# Patient Record
Sex: FEMALE | Race: WHITE | NOT HISPANIC OR LATINO | URBAN - METROPOLITAN AREA
[De-identification: names, ages, dates, MRNs, and addresses within clinical notes are randomized per-mention and may not be internally consistent; named-entity substitution may affect disease eponyms.]

---

## 2019-09-02 ENCOUNTER — EMERGENCY (EMERGENCY)
Facility: HOSPITAL | Age: 49
LOS: 1 days | Discharge: HOME | End: 2019-09-02
Attending: EMERGENCY MEDICINE | Admitting: EMERGENCY MEDICINE
Payer: COMMERCIAL

## 2019-09-02 VITALS
HEART RATE: 84 BPM | OXYGEN SATURATION: 99 % | DIASTOLIC BLOOD PRESSURE: 82 MMHG | TEMPERATURE: 99 F | SYSTOLIC BLOOD PRESSURE: 126 MMHG | RESPIRATION RATE: 19 BRPM

## 2019-09-02 VITALS
RESPIRATION RATE: 18 BRPM | DIASTOLIC BLOOD PRESSURE: 85 MMHG | HEART RATE: 94 BPM | SYSTOLIC BLOOD PRESSURE: 122 MMHG | WEIGHT: 190.92 LBS | OXYGEN SATURATION: 98 % | TEMPERATURE: 99 F

## 2019-09-02 LAB
ALBUMIN SERPL ELPH-MCNC: 4.1 G/DL — SIGNIFICANT CHANGE UP (ref 3.3–5)
ALP SERPL-CCNC: 80 U/L — SIGNIFICANT CHANGE UP (ref 40–120)
ALT FLD-CCNC: 20 U/L — SIGNIFICANT CHANGE UP (ref 10–45)
ANION GAP SERPL CALC-SCNC: 14 MMOL/L — SIGNIFICANT CHANGE UP (ref 5–17)
APTT BLD: 32.4 SEC — SIGNIFICANT CHANGE UP (ref 27.5–36.3)
AST SERPL-CCNC: 24 U/L — SIGNIFICANT CHANGE UP (ref 10–40)
BASOPHILS # BLD AUTO: 0.04 K/UL — SIGNIFICANT CHANGE UP (ref 0–0.2)
BASOPHILS NFR BLD AUTO: 0.3 % — SIGNIFICANT CHANGE UP (ref 0–2)
BILIRUB SERPL-MCNC: 0.3 MG/DL — SIGNIFICANT CHANGE UP (ref 0.2–1.2)
BUN SERPL-MCNC: 16 MG/DL — SIGNIFICANT CHANGE UP (ref 7–23)
CALCIUM SERPL-MCNC: 9.7 MG/DL — SIGNIFICANT CHANGE UP (ref 8.4–10.5)
CHLORIDE SERPL-SCNC: 100 MMOL/L — SIGNIFICANT CHANGE UP (ref 96–108)
CO2 SERPL-SCNC: 22 MMOL/L — SIGNIFICANT CHANGE UP (ref 22–31)
CREAT SERPL-MCNC: 0.82 MG/DL — SIGNIFICANT CHANGE UP (ref 0.5–1.3)
EOSINOPHIL # BLD AUTO: 0.07 K/UL — SIGNIFICANT CHANGE UP (ref 0–0.5)
EOSINOPHIL NFR BLD AUTO: 0.5 % — SIGNIFICANT CHANGE UP (ref 0–6)
ERYTHROCYTE [SEDIMENTATION RATE] IN BLOOD: 47 MM/HR — HIGH
GLUCOSE SERPL-MCNC: 91 MG/DL — SIGNIFICANT CHANGE UP (ref 70–99)
HCT VFR BLD CALC: 41.4 % — SIGNIFICANT CHANGE UP (ref 34.5–45)
HGB BLD-MCNC: 13.4 G/DL — SIGNIFICANT CHANGE UP (ref 11.5–15.5)
IMM GRANULOCYTES NFR BLD AUTO: 0.4 % — SIGNIFICANT CHANGE UP (ref 0–1.5)
INR BLD: 0.99 — SIGNIFICANT CHANGE UP (ref 0.88–1.16)
LYMPHOCYTES # BLD AUTO: 2.8 K/UL — SIGNIFICANT CHANGE UP (ref 1–3.3)
LYMPHOCYTES # BLD AUTO: 21.3 % — SIGNIFICANT CHANGE UP (ref 13–44)
MCHC RBC-ENTMCNC: 25.8 PG — LOW (ref 27–34)
MCHC RBC-ENTMCNC: 32.4 GM/DL — SIGNIFICANT CHANGE UP (ref 32–36)
MCV RBC AUTO: 79.6 FL — LOW (ref 80–100)
MONOCYTES # BLD AUTO: 1.09 K/UL — HIGH (ref 0–0.9)
MONOCYTES NFR BLD AUTO: 8.3 % — SIGNIFICANT CHANGE UP (ref 2–14)
NEUTROPHILS # BLD AUTO: 9.1 K/UL — HIGH (ref 1.8–7.4)
NEUTROPHILS NFR BLD AUTO: 69.2 % — SIGNIFICANT CHANGE UP (ref 43–77)
NRBC # BLD: 0 /100 WBCS — SIGNIFICANT CHANGE UP (ref 0–0)
PLATELET # BLD AUTO: 423 K/UL — HIGH (ref 150–400)
POTASSIUM SERPL-MCNC: 4.3 MMOL/L — SIGNIFICANT CHANGE UP (ref 3.5–5.3)
POTASSIUM SERPL-SCNC: 4.3 MMOL/L — SIGNIFICANT CHANGE UP (ref 3.5–5.3)
PROT SERPL-MCNC: 8.2 G/DL — SIGNIFICANT CHANGE UP (ref 6–8.3)
PROTHROM AB SERPL-ACNC: 11.2 SEC — SIGNIFICANT CHANGE UP (ref 10–12.9)
RBC # BLD: 5.2 M/UL — SIGNIFICANT CHANGE UP (ref 3.8–5.2)
RBC # FLD: 13.4 % — SIGNIFICANT CHANGE UP (ref 10.3–14.5)
SODIUM SERPL-SCNC: 136 MMOL/L — SIGNIFICANT CHANGE UP (ref 135–145)
WBC # BLD: 13.15 K/UL — HIGH (ref 3.8–10.5)
WBC # FLD AUTO: 13.15 K/UL — HIGH (ref 3.8–10.5)

## 2019-09-02 PROCEDURE — 80053 COMPREHEN METABOLIC PANEL: CPT

## 2019-09-02 PROCEDURE — 85610 PROTHROMBIN TIME: CPT

## 2019-09-02 PROCEDURE — 99283 EMERGENCY DEPT VISIT LOW MDM: CPT

## 2019-09-02 PROCEDURE — 85730 THROMBOPLASTIN TIME PARTIAL: CPT

## 2019-09-02 PROCEDURE — 99284 EMERGENCY DEPT VISIT MOD MDM: CPT

## 2019-09-02 PROCEDURE — 85652 RBC SED RATE AUTOMATED: CPT

## 2019-09-02 PROCEDURE — 36415 COLL VENOUS BLD VENIPUNCTURE: CPT

## 2019-09-02 PROCEDURE — 85025 COMPLETE CBC W/AUTO DIFF WBC: CPT

## 2019-09-02 RX ORDER — IBUPROFEN 200 MG
400 TABLET ORAL ONCE
Refills: 0 | Status: COMPLETED | OUTPATIENT
Start: 2019-09-02 | End: 2019-09-02

## 2019-09-02 RX ADMIN — Medication 400 MILLIGRAM(S): at 21:44

## 2019-09-02 RX ADMIN — Medication 400 MILLIGRAM(S): at 21:39

## 2019-09-02 NOTE — ED PROVIDER NOTE - PHYSICAL EXAMINATION
CONSTITUTIONAL: WD,WN. NAD.    SKIN: Normal color and turgor. Numerous palpable, nontender, purple-red non-blanching skin lesions to bilateral lower legs and buttocks, ranging in size from 0.5-1 cm in diameter.  No mucosal lesions.  No lesions to palms/soles.    HEAD: NC/AT.  EYES: Conjunctiva clear. EOMI. PERRL.    ENT: Airway patent, OP without erythema, tonsillar swelling or exudate; uvula midline without swelling. Nasal mucosa clear, no rhinorrhea.   RESPIRATORY:  Breathing non-labored. No retractions or accessory muscle use.  Lungs CTA bilat.  CARDIOVASCULAR:  RRR, S1S2. No M/R/G.      GI:  Abdomen soft, nontender.    MSK: Neck supple with painless ROM.  No lower extremity edema or calf tenderness.  Mild puffiness to volar aspect left wrist, no erythema/warmth.  Joints with FROM.   NEURO: Alert and oriented; CN II-XII grossly intact. Speech clear. 5/5 strength in all extremities.  Normal balance and gait.

## 2019-09-02 NOTE — ED PROVIDER NOTE - NS ED ROS FT
CONSTITUTIONAL: No fever, chills, or weakness  NEURO: No headache, no dizziness, no syncope; No focal weakness/tingling/numbness  EYES: No visual changes  ENT: No rhinorrhea or sore throat  PULM: No cough or dyspnea  CV: No chest pain or palpitations  GI: No abdominal pain, vomiting, or diarrhea  : No dysuria, hematuria, frequency  MSK: No neck pain or back pain  SKIN: HPI

## 2019-09-02 NOTE — ED PROVIDER NOTE - ATTENDING CONTRIBUTION TO CARE
48F pmh HSP 20yr ago p/w rash to legs, stiffness to wrists & l elbow. Similar to prior HSP onset sx. No f/c, no abd pain, no n/v, no gi bleed sx. Nontoxic appearing. Has BLE palpable purpura b/l to thigh & buttocks. Labs noted for leukocytosis, no MOY. stable. Has rheum for outpt f/u w/ likely HSP

## 2019-09-02 NOTE — ED PROVIDER NOTE - OBJECTIVE STATEMENT
47 yo fem with Hx of Henoch-Schonlein Purpura apx 10 yrs ago was in [facility] until waking this morning with small red bumps on her ankles.  She initially thought she had insect bites, but then noticed that the rash involved both legs including thighs and buttocks, which had been covered by long pants.  She developed stiffness and mild puffiness of her wrists today, and soreness in the left elbow.  No fever, abdominal pain, NVD, blood in stool/melena, hematuria, or other stigmata of bleeding.  She is menstruating, bleeding consistent with her usual period.  No cough/URI symptoms, chest pain, or dyspnea.  Symptoms similar to her presentation of HSP in the past.    HSP treated in the past with a course of steroids, fully recovered.  Was managed by rheumatologist (Dr Shahid).  No longer has a PCP (was Dr Millan).

## 2019-09-02 NOTE — ED PROVIDER NOTE - CARE PROVIDER_API CALL
Bridgette Shahid)  Rheumatology  [address] [place] [address], [place] [address]  Phone: [phone]  Fax: [phone]  Follow Up Time: 1-3 Days

## 2019-09-02 NOTE — ED PROVIDER NOTE - CLINICAL SUMMARY MEDICAL DECISION MAKING FREE TEXT BOX
Acute onset bilateral lower extremity palpable purpura with joint stiffness.  Patient otherwise well-appearing and nontoxic.  Suspicious for recurrence of HSP.  Will check labs, give NSAIDs for mild joint pain.  If labs without significant abnormalities, she will be able to follow up with Rheum as outpatient.

## 2019-09-02 NOTE — ED ADULT NURSE NOTE - OBJECTIVE STATEMENT
Patient is a 49yo female reporting palpable purpura to bilateral legs and buttocks as well as pain and swelling to bilateral wrists and left elbow. Patient has hx of Henoch-Schönlein purpura and reports symptoms are similar. Denies chest pain, shortness of breath, abdominal pain, n/v/d, fevers.

## 2019-09-02 NOTE — ED PROVIDER NOTE - NSFOLLOWUPINSTRUCTIONS_ED_ALL_ED_FT
Please follow up with your rheumatologist in the next 1-3 days.  Treat joint pain/stiffness with ibuprofen or naproxen; use as directed.  Return to the Emergency Department if you have any new or worsening symptoms, or if you have any concerns.  _____________________________________________________    Henoch–Schonlein Purpura, Adult  Henoch–Schonlein purpura (HSP) is a condition that causes swelling and inflammation of small blood vessels (vasculitis). This inflammation makes blood vessels leak, which causes a rash. The rash can appear anywhere, but it is most common on the arms, legs, and buttocks. HSP can also cause:  Bleeding into your bowel and kidneys.  Joint inflammation.  Kidney damage. It can lead to long-term or end-stage kidney disease and kidney failure.  What are the causes?  An overactive disease-fighting system (immune system) causes HSP. This means that something triggers your immune system to produce proteins (antibodies) that attack your blood vessels. This is called an autoimmune reaction. Triggers for HSP may include:  Infections from certain viruses or bacteria.  Certain medicines.  Certain types of cancer.  What increases the risk?  You are more likely to develop this condition if:  You have allergies.  You have a family history of HSP.  You are male.  What are the signs or symptoms?  The main symptom of this condition is a skin rash that may look like raised bruises or dots. The rash changes color over time from reddish-purple to rust-colored. The rash color does not go away when you press the spots.    Other symptoms include:  Fever.  Tiredness (fatigue).  Abdomen (abdominal) pain. This may include nausea, vomiting, and diarrhea.  Blood or protein in your urine.  Blood in your stool.  Swollen and painful joints, especially the knees and ankles.  Swollen testicles in men.  How is this diagnosed?  This condition may be diagnosed based on:  Your symptoms and medical history.  A physical exam.  Urine tests to check for blood or protein.  Blood tests to check for complications and to make sure there is not another cause of your illness.  Removing a small skin sample and examining it under a microscope (skin biopsy).  Kidney biopsy.  How is this treated?  There is no cure for this condition, but treatment can help to relieve symptoms. Treatment may include:  Staying in bed (bed rest).  Raising (elevating) affected arms and legs above the level of the heart.  Drinking more fluids.  Taking medicines to reduce inflammation and pain. These may include:  NSAIDs to relieve aches and fever.  Corticosteroids to lessen inflammation and relieve pain.  If you have severe kidney damage, you may need medicines that reduce the activity of the immune system (immunosuppressants). Kidney failure may be treated with a process to clean the blood of wastes, salt, and extra fluid (dialysis).    Follow these instructions at home:    Take over-the-counter and prescription medicines only as told by your health care provider.  Follow instructions from your health care provider about activity restrictions or bed rest.  Raise (elevate) affected arms and legs above the level of your heart when you experience symptoms.  Drink enough fluid to keep your urine pale yellow.  Keep all follow-up visits as told by your health care provider. This is important.  Contact a health care provider if:  Your symptoms get worse, and medicines do not help.  You have a little bit of blood in your urine or stool.  Get help right away if:  You have severe abdominal pain.  You vomit repeatedly.  You have heavy bleeding in your urine or stool.  You are not able to urinate.  You have abdominal pain and are not having any bowel movements.  You have a seizure.  You have mental confusion.  You have chest pain.  You have trouble breathing or shortness of breath.  Summary  Henoch–Schonlein purpura (HSP) is a condition that causes swelling and inflammation of small blood vessels (vasculitis).  An overactive disease-fighting system (immune system) causes HSP.  A skin rash is the most common symptom.  There is no cure for HSP, but treatment may help to relieve symptoms.  This information is not intended to replace advice given to you by your health care provider. Make sure you discuss any questions you have with your health care provider.

## 2019-09-02 NOTE — ED ADULT TRIAGE NOTE - CHIEF COMPLAINT QUOTE
pt noticed a rash on her legs from feet to groin this am and is also having some stiffness in her hands and elbows, hx HSP in 2008 and states the symptoms were similar at that time

## 2019-09-02 NOTE — ED PROVIDER NOTE - PATIENT PORTAL LINK FT
You can access the FollowMyHealth Patient Portal offered by [facility] by registering at the following website: [email or web address]. By joining [employer]’s FollowMyHealth portal, you will also be able to view your health information using other applications (apps) compatible with our system.

## 2019-09-07 DIAGNOSIS — R21 RASH AND OTHER NONSPECIFIC SKIN ERUPTION: ICD-10-CM

## 2019-09-07 DIAGNOSIS — D69.0 ALLERGIC PURPURA: ICD-10-CM

## 2019-09-09 ENCOUNTER — EMERGENCY (EMERGENCY)
Facility: HOSPITAL | Age: 49
LOS: 1 days | Discharge: HOME | End: 2019-09-09
Attending: EMERGENCY MEDICINE | Admitting: EMERGENCY MEDICINE
Payer: COMMERCIAL

## 2019-09-09 VITALS
HEART RATE: 78 BPM | RESPIRATION RATE: 18 BRPM | DIASTOLIC BLOOD PRESSURE: 64 MMHG | SYSTOLIC BLOOD PRESSURE: 100 MMHG | OXYGEN SATURATION: 98 % | TEMPERATURE: 98 F

## 2019-09-09 VITALS
SYSTOLIC BLOOD PRESSURE: 122 MMHG | OXYGEN SATURATION: 99 % | HEART RATE: 86 BPM | TEMPERATURE: 98 F | RESPIRATION RATE: 22 BRPM | DIASTOLIC BLOOD PRESSURE: 84 MMHG | HEIGHT: 64 IN | WEIGHT: 184.97 LBS

## 2019-09-09 DIAGNOSIS — R10.9 UNSPECIFIED ABDOMINAL PAIN: ICD-10-CM

## 2019-09-09 DIAGNOSIS — R10.13 EPIGASTRIC PAIN: ICD-10-CM

## 2019-09-09 DIAGNOSIS — E86.0 DEHYDRATION: ICD-10-CM

## 2019-09-09 DIAGNOSIS — R42 DIZZINESS AND GIDDINESS: ICD-10-CM

## 2019-09-09 DIAGNOSIS — R11.2 NAUSEA WITH VOMITING, UNSPECIFIED: ICD-10-CM

## 2019-09-09 LAB
ALBUMIN SERPL ELPH-MCNC: 3.7 G/DL — SIGNIFICANT CHANGE UP (ref 3.3–5)
ALP SERPL-CCNC: 66 U/L — SIGNIFICANT CHANGE UP (ref 40–120)
ALT FLD-CCNC: 15 U/L — SIGNIFICANT CHANGE UP (ref 10–45)
ANION GAP SERPL CALC-SCNC: 12 MMOL/L — SIGNIFICANT CHANGE UP (ref 5–17)
APPEARANCE UR: CLEAR — SIGNIFICANT CHANGE UP
APTT BLD: 26.6 SEC — LOW (ref 27.5–36.3)
AST SERPL-CCNC: 17 U/L — SIGNIFICANT CHANGE UP (ref 10–40)
BASOPHILS # BLD AUTO: 0.03 K/UL — SIGNIFICANT CHANGE UP (ref 0–0.2)
BASOPHILS NFR BLD AUTO: 0.2 % — SIGNIFICANT CHANGE UP (ref 0–2)
BILIRUB SERPL-MCNC: 0.5 MG/DL — SIGNIFICANT CHANGE UP (ref 0.2–1.2)
BILIRUB UR-MCNC: ABNORMAL
BUN SERPL-MCNC: 16 MG/DL — SIGNIFICANT CHANGE UP (ref 7–23)
CALCIUM SERPL-MCNC: 9 MG/DL — SIGNIFICANT CHANGE UP (ref 8.4–10.5)
CHLORIDE SERPL-SCNC: 100 MMOL/L — SIGNIFICANT CHANGE UP (ref 96–108)
CO2 SERPL-SCNC: 23 MMOL/L — SIGNIFICANT CHANGE UP (ref 22–31)
COLOR SPEC: YELLOW — SIGNIFICANT CHANGE UP
CREAT SERPL-MCNC: 0.68 MG/DL — SIGNIFICANT CHANGE UP (ref 0.5–1.3)
DIFF PNL FLD: ABNORMAL
EOSINOPHIL # BLD AUTO: 0.01 K/UL — SIGNIFICANT CHANGE UP (ref 0–0.5)
EOSINOPHIL NFR BLD AUTO: 0.1 % — SIGNIFICANT CHANGE UP (ref 0–6)
GLUCOSE SERPL-MCNC: 121 MG/DL — HIGH (ref 70–99)
GLUCOSE UR QL: NEGATIVE — SIGNIFICANT CHANGE UP
HCT VFR BLD CALC: 41.4 % — SIGNIFICANT CHANGE UP (ref 34.5–45)
HGB BLD-MCNC: 13.6 G/DL — SIGNIFICANT CHANGE UP (ref 11.5–15.5)
IMM GRANULOCYTES NFR BLD AUTO: 0.6 % — SIGNIFICANT CHANGE UP (ref 0–1.5)
INR BLD: 1.09 — SIGNIFICANT CHANGE UP (ref 0.88–1.16)
KETONES UR-MCNC: 15 MG/DL
LEUKOCYTE ESTERASE UR-ACNC: NEGATIVE — SIGNIFICANT CHANGE UP
LYMPHOCYTES # BLD AUTO: 14.9 % — SIGNIFICANT CHANGE UP (ref 13–44)
LYMPHOCYTES # BLD AUTO: 2.52 K/UL — SIGNIFICANT CHANGE UP (ref 1–3.3)
MAGNESIUM SERPL-MCNC: 1.8 MG/DL — SIGNIFICANT CHANGE UP (ref 1.6–2.6)
MCHC RBC-ENTMCNC: 25.8 PG — LOW (ref 27–34)
MCHC RBC-ENTMCNC: 32.9 GM/DL — SIGNIFICANT CHANGE UP (ref 32–36)
MCV RBC AUTO: 78.6 FL — LOW (ref 80–100)
MONOCYTES # BLD AUTO: 1.35 K/UL — HIGH (ref 0–0.9)
MONOCYTES NFR BLD AUTO: 8 % — SIGNIFICANT CHANGE UP (ref 2–14)
NEUTROPHILS # BLD AUTO: 12.85 K/UL — HIGH (ref 1.8–7.4)
NEUTROPHILS NFR BLD AUTO: 76.2 % — SIGNIFICANT CHANGE UP (ref 43–77)
NITRITE UR-MCNC: NEGATIVE — SIGNIFICANT CHANGE UP
NRBC # BLD: 0 /100 WBCS — SIGNIFICANT CHANGE UP (ref 0–0)
PH UR: 6 — SIGNIFICANT CHANGE UP (ref 5–8)
PLATELET # BLD AUTO: 478 K/UL — HIGH (ref 150–400)
POTASSIUM SERPL-MCNC: 3.7 MMOL/L — SIGNIFICANT CHANGE UP (ref 3.5–5.3)
POTASSIUM SERPL-SCNC: 3.7 MMOL/L — SIGNIFICANT CHANGE UP (ref 3.5–5.3)
PROT SERPL-MCNC: 7.1 G/DL — SIGNIFICANT CHANGE UP (ref 6–8.3)
PROT UR-MCNC: NEGATIVE MG/DL — SIGNIFICANT CHANGE UP
PROTHROM AB SERPL-ACNC: 12.3 SEC — SIGNIFICANT CHANGE UP (ref 10–12.9)
RBC # BLD: 5.27 M/UL — HIGH (ref 3.8–5.2)
RBC # FLD: 12.8 % — SIGNIFICANT CHANGE UP (ref 10.3–14.5)
SODIUM SERPL-SCNC: 135 MMOL/L — SIGNIFICANT CHANGE UP (ref 135–145)
SP GR SPEC: 1.02 — SIGNIFICANT CHANGE UP (ref 1–1.03)
UROBILINOGEN FLD QL: 0.2 E.U./DL — SIGNIFICANT CHANGE UP
WBC # BLD: 16.86 K/UL — HIGH (ref 3.8–10.5)
WBC # FLD AUTO: 16.86 K/UL — HIGH (ref 3.8–10.5)

## 2019-09-09 PROCEDURE — 85730 THROMBOPLASTIN TIME PARTIAL: CPT

## 2019-09-09 PROCEDURE — 83735 ASSAY OF MAGNESIUM: CPT

## 2019-09-09 PROCEDURE — 36415 COLL VENOUS BLD VENIPUNCTURE: CPT

## 2019-09-09 PROCEDURE — 99284 EMERGENCY DEPT VISIT MOD MDM: CPT | Mod: 25

## 2019-09-09 PROCEDURE — 96374 THER/PROPH/DIAG INJ IV PUSH: CPT

## 2019-09-09 PROCEDURE — 85610 PROTHROMBIN TIME: CPT

## 2019-09-09 PROCEDURE — 96375 TX/PRO/DX INJ NEW DRUG ADDON: CPT

## 2019-09-09 PROCEDURE — 85025 COMPLETE CBC W/AUTO DIFF WBC: CPT

## 2019-09-09 PROCEDURE — 99284 EMERGENCY DEPT VISIT MOD MDM: CPT

## 2019-09-09 PROCEDURE — 81001 URINALYSIS AUTO W/SCOPE: CPT

## 2019-09-09 PROCEDURE — 96361 HYDRATE IV INFUSION ADD-ON: CPT

## 2019-09-09 PROCEDURE — 80053 COMPREHEN METABOLIC PANEL: CPT

## 2019-09-09 RX ORDER — ONDANSETRON 8 MG/1
1 TABLET, FILM COATED ORAL
Qty: 12 | Refills: 0
Start: 2019-09-09 | End: 2019-09-11

## 2019-09-09 RX ORDER — PANTOPRAZOLE SODIUM 20 MG/1
40 TABLET, DELAYED RELEASE ORAL ONCE
Refills: 0 | Status: COMPLETED | OUTPATIENT
Start: 2019-09-09 | End: 2019-09-09

## 2019-09-09 RX ORDER — FAMOTIDINE 10 MG/ML
20 INJECTION INTRAVENOUS ONCE
Refills: 0 | Status: COMPLETED | OUTPATIENT
Start: 2019-09-09 | End: 2019-09-09

## 2019-09-09 RX ORDER — SODIUM CHLORIDE 9 MG/ML
1000 INJECTION INTRAMUSCULAR; INTRAVENOUS; SUBCUTANEOUS ONCE
Refills: 0 | Status: COMPLETED | OUTPATIENT
Start: 2019-09-09 | End: 2019-09-09

## 2019-09-09 RX ORDER — ONDANSETRON 8 MG/1
4 TABLET, FILM COATED ORAL ONCE
Refills: 0 | Status: COMPLETED | OUTPATIENT
Start: 2019-09-09 | End: 2019-09-09

## 2019-09-09 RX ADMIN — SODIUM CHLORIDE 1000 MILLILITER(S): 9 INJECTION INTRAMUSCULAR; INTRAVENOUS; SUBCUTANEOUS at 22:46

## 2019-09-09 RX ADMIN — PANTOPRAZOLE SODIUM 40 MILLIGRAM(S): 20 TABLET, DELAYED RELEASE ORAL at 21:55

## 2019-09-09 RX ADMIN — ONDANSETRON 4 MILLIGRAM(S): 8 TABLET, FILM COATED ORAL at 21:55

## 2019-09-09 RX ADMIN — Medication 100 MILLIGRAM(S): at 21:55

## 2019-09-09 RX ADMIN — FAMOTIDINE 20 MILLIGRAM(S): 10 INJECTION INTRAVENOUS at 21:54

## 2019-09-09 RX ADMIN — SODIUM CHLORIDE 1000 MILLILITER(S): 9 INJECTION INTRAMUSCULAR; INTRAVENOUS; SUBCUTANEOUS at 21:55

## 2019-09-09 RX ADMIN — Medication 30 MILLILITER(S): at 22:42

## 2019-09-09 NOTE — ED PROVIDER NOTE - OBJECTIVE STATEMENT
47 yo fem with Hx of Henoch-Schonlein Purpura apx 10 yrs ago was in [facility] until 9/2 with small red bumps on her ankles, then spread to both legs including thighs and buttocks and mild puffiness of her wrists today, and soreness in the left elbow.  No fever, abdominal pain, NVD, blood in stool/melena, hematuria, or other stigmata of bleeding.  No cough/URI symptoms, chest pain, or dyspnea.  Symptoms similar to her presentation of HSP in the past. 47 yo fem with Hx of Henoch-Schonlein Purpura apx 10 yrs ago was in [facility] until 9/2 with small red bumps on her ankles, then spread to both legs including thighs and buttocks and mild puffiness of her wrists today, and soreness in the left elbow.  No fever, abdominal pain, NVD, blood in stool/melena, hematuria, or other stigmata of bleeding.  No cough/URI symptoms, chest pain, or dyspnea.  Symptoms similar to her presentation of HSP in the past. almost done w/ medrol dose pack but now w/ worsening epigastric pain, nasuea and vomiting, unable to tolerate po today. denies bloody emesis, BRBPR, melena. rash also increased now above buttocks, saw her rheum earlier today who was planning to do steroid taper, colchicine and PPIs but pharmacy did not have meds, will have them tomorrow.

## 2019-09-09 NOTE — ED ADULT NURSE NOTE - OBJECTIVE STATEMENT
Pt alert and oriented X3. Pt complains of 10/10 upper center abdominal pain along with nausea. One episode of nausea (brownish emesis) at ER arrival. Pt states this happens due to vasculitis. Petechia rash to upper extremities, nontender.

## 2019-09-09 NOTE — ED PROVIDER NOTE - CARE PLAN
Principal Discharge DX:	Epigastric pain  Secondary Diagnosis:	Non-intractable vomiting with nausea, unspecified vomiting type  Secondary Diagnosis:	Dehydration

## 2019-09-09 NOTE — ED PROVIDER NOTE - PROGRESS NOTE DETAILS
case d/w her rheum, dr barney, plan for iv steroids, and GI cocktail. thinks pain likely 2/2 her HSP + gastritis

## 2019-09-09 NOTE — ED ADULT NURSE NOTE - IS THE PATIENT ABLE TO BE SCREENED?
Benign hypertension    Diabetes mellitus, type II    Gall stone pancreatitis  S/P laparoscopic cholecystectomy  Low back pain
Yes

## 2019-09-09 NOTE — ED PROVIDER NOTE - CLINICAL SUMMARY MEDICAL DECISION MAKING FREE TEXT BOX
here w/ worsening abdominal pain, nausea, dizziness, in setting of recent HSP diagnosis, almost done w/ medrol dosepak but worsening. plan for iv steroids, gi cocktail, iv hydration, iv anti emetics, reassess. doubt need for ct scan at this time as exam more consistent w/ gastritis, no tenderness elsewhere in abdomen

## 2019-09-09 NOTE — ED PROVIDER NOTE - NSFOLLOWUPINSTRUCTIONS_ED_ALL_ED_FT
Gastritis, Adult    Gastritis is swelling (inflammation) of the stomach. When you have this condition, you can have these problems (symptoms):  Pain in your stomach.  A burning feeling in your stomach.  Feeling sick to your stomach (nauseous).  Throwing up (vomiting).  Feeling too full after you eat.  It is important to get help for this condition. If you do not get help, your stomach can bleed, and you can get sores (ulcers) in your stomach.    Follow these instructions at home:  Image   Take over-the-counter and prescription medicines only as told by your doctor.  If you were prescribed an antibiotic medicine, take it as told by your doctor. Do not stop taking it even if you start to feel better.  Drink enough fluid to keep your pee (urine) pale yellow.  Instead of eating big meals, eat small meals often.  Avoid foods and drinks that make your symptoms worse.  Contact a doctor if:  Your problems get worse.  Your problems go away and then come back.  Get help right away if:  You throw up blood or something that looks like coffee grounds.  You have black or dark red poop (stools).  You throw up any time you try to take a drink.  Your stomach pain gets worse.  You have a fever.  You do not feel better after 1 week.  Summary  Gastritis is swelling (inflammation) of the stomach.  It is important to get help for this condition. If you do not get help, your stomach can bleed, and you can get sores (ulcers) in your stomach.  Take over-the-counter and prescription medicines only as told by your doctor.  This information is not intended to replace advice given to you by your health care provider. Make sure you discuss any questions you have with your health care provider.    Document Released: 06/05/2009 Document Revised: 07/31/2018 Document Reviewed: 09/10/2016  [employer] Patient Education © 2019 [employer]

## 2019-09-09 NOTE — ED PROVIDER NOTE - PATIENT PORTAL LINK FT
You can access the FollowMyHealth Patient Portal offered by [facility] by registering at the following website: [email or web address]. By joining [employer]’s FollowMyHealth portal, you will also be able to view your health information using other applications (apps) compatible with our system.

## 2019-09-09 NOTE — ED ADULT TRIAGE NOTE - CHIEF COMPLAINT QUOTE
pt. with pmh of vasculitis presents with c/o abdominal pain and nausea since last night, pt. states "my stomach is on fire", pt. is on prednisone.

## 2019-09-09 NOTE — ED ADULT NURSE NOTE - CHPI ED NUR SYMPTOMS NEG
no hematuria/no burning urination/no fever/no abdominal distension/no diarrhea/no vomiting/no blood in stool/no chills/no dysuria

## 2019-09-09 NOTE — ED ADULT NURSE NOTE - GASTROINTESTINAL WDL
Abdomen soft, nontender, nondistended, bowel sounds present in all 4 quadrants. Complains of abdminal pain and nausea

## 2019-09-09 NOTE — ED PROVIDER NOTE - PHYSICAL EXAMINATION
CONSTITUTIONAL: Well-appearing; well-nourished; in no apparent distress.   HEAD: Normocephalic; atraumatic.   EYES:  conjunctiva and sclera clear  ENT: normal nose; no rhinorrhea; normal pharynx with no erythema or lesions.   NECK: Supple; non-tender;   CARDIOVASCULAR: Normal S1, S2; no murmurs, rubs, or gallops. Regular rate and rhythm.   RESPIRATORY: Breathing easily; breath sounds clear and equal bilaterally; no wheezes, rhonchi, or rales.  GI: Soft; non-distended; +epigastric tenderness. no palpable organomegaly.   EXT: No cyanosis or edema; N/V intact,   SKIN: Normal for age and race; warm; dry; good turgor; no apparent lesions. purpuric lesions on lower extremities and upper extremites.   NEURO: A & O x 3; face symmetric; grossly unremarkable.   PSYCHOLOGICAL: The patient’s mood and manner are appropriate.

## 2019-09-10 PROBLEM — D69.0 ALLERGIC PURPURA: Chronic | Status: ACTIVE | Noted: 2019-09-02

## 2020-01-19 NOTE — ED ADULT NURSE NOTE - EENT WDL
fall
Eyes with no visual disturbances.  Nose with pink mucosa and no drainage.  Mouth mucous membranes moist and pink.  No tenderness or swelling to throat or neck.

## 2020-02-18 ENCOUNTER — APPOINTMENT (OUTPATIENT)
Dept: ORTHOPEDIC SURGERY | Facility: CLINIC | Age: 50
End: 2020-02-18
Payer: COMMERCIAL

## 2020-02-18 VITALS — RESPIRATION RATE: 16 BRPM | HEIGHT: 64 IN | WEIGHT: 190 LBS | BODY MASS INDEX: 32.44 KG/M2

## 2020-02-18 DIAGNOSIS — Z78.9 OTHER SPECIFIED HEALTH STATUS: ICD-10-CM

## 2020-02-18 DIAGNOSIS — M25.531 PAIN IN RIGHT WRIST: ICD-10-CM

## 2020-02-18 DIAGNOSIS — Z82.61 FAMILY HISTORY OF ARTHRITIS: ICD-10-CM

## 2020-02-18 DIAGNOSIS — Z87.39 PERSONAL HISTORY OF OTHER DISEASES OF THE MUSCULOSKELETAL SYSTEM AND CONNECTIVE TISSUE: ICD-10-CM

## 2020-02-18 DIAGNOSIS — Z86.69 PERSONAL HISTORY OF OTHER DISEASES OF THE NERVOUS SYSTEM AND SENSE ORGANS: ICD-10-CM

## 2020-02-18 DIAGNOSIS — Z80.9 FAMILY HISTORY OF MALIGNANT NEOPLASM, UNSPECIFIED: ICD-10-CM

## 2020-02-18 PROBLEM — Z00.00 ENCOUNTER FOR PREVENTIVE HEALTH EXAMINATION: Status: ACTIVE | Noted: 2020-02-18

## 2020-02-18 PROCEDURE — 99203 OFFICE O/P NEW LOW 30 MIN: CPT | Mod: 25

## 2020-02-18 PROCEDURE — 73110 X-RAY EXAM OF WRIST: CPT | Mod: LT

## 2020-02-18 PROCEDURE — 20600 DRAIN/INJ JOINT/BURSA W/O US: CPT | Mod: RT

## 2020-11-16 ENCOUNTER — APPOINTMENT (OUTPATIENT)
Dept: ORTHOPEDIC SURGERY | Facility: CLINIC | Age: 50
End: 2020-11-16
Payer: COMMERCIAL

## 2020-11-16 VITALS — RESPIRATION RATE: 16 BRPM | BODY MASS INDEX: 32.44 KG/M2 | WEIGHT: 190 LBS | HEIGHT: 64 IN

## 2020-11-16 DIAGNOSIS — M65.4 RADIAL STYLOID TENOSYNOVITIS [DE QUERVAIN]: ICD-10-CM

## 2020-11-16 PROCEDURE — 99072 ADDL SUPL MATRL&STAF TM PHE: CPT

## 2020-11-16 PROCEDURE — 20550 NJX 1 TENDON SHEATH/LIGAMENT: CPT | Mod: RT

## 2020-11-16 PROCEDURE — 99214 OFFICE O/P EST MOD 30 MIN: CPT | Mod: 25

## 2021-11-01 ENCOUNTER — EMERGENCY (EMERGENCY)
Facility: HOSPITAL | Age: 51
LOS: 1 days | Discharge: HOME | End: 2021-11-01
Admitting: EMERGENCY MEDICINE
Payer: COMMERCIAL

## 2021-11-01 VITALS
DIASTOLIC BLOOD PRESSURE: 81 MMHG | WEIGHT: 205.03 LBS | TEMPERATURE: 98 F | SYSTOLIC BLOOD PRESSURE: 117 MMHG | OXYGEN SATURATION: 98 % | HEIGHT: 64 IN | HEART RATE: 89 BPM | RESPIRATION RATE: 16 BRPM

## 2021-11-01 DIAGNOSIS — M25.562 PAIN IN LEFT KNEE: ICD-10-CM

## 2021-11-01 PROCEDURE — 73564 X-RAY EXAM KNEE 4 OR MORE: CPT

## 2021-11-01 PROCEDURE — 99283 EMERGENCY DEPT VISIT LOW MDM: CPT | Mod: 25

## 2021-11-01 PROCEDURE — 73564 X-RAY EXAM KNEE 4 OR MORE: CPT | Mod: 26,LT

## 2021-11-01 PROCEDURE — 99283 EMERGENCY DEPT VISIT LOW MDM: CPT

## 2021-11-01 RX ORDER — IBUPROFEN 200 MG
600 TABLET ORAL ONCE
Refills: 0 | Status: COMPLETED | OUTPATIENT
Start: 2021-11-01 | End: 2021-11-01

## 2021-11-01 RX ORDER — IBUPROFEN 200 MG
1 TABLET ORAL
Qty: 30 | Refills: 0
Start: 2021-11-01 | End: 2021-11-10

## 2021-11-01 RX ADMIN — Medication 600 MILLIGRAM(S): at 17:04

## 2021-11-01 NOTE — ED ADULT TRIAGE NOTE - CHIEF COMPLAINT QUOTE
Pt co L knee pain x1 month worsening over the past few, pain worse w weight bearing. Denies known injury/ trauma. no obvious deformities.

## 2021-11-01 NOTE — ED PROVIDER NOTE - PROVIDER TOKENS
PROVIDER:[TOKEN:[[id number]],FOLLOWUP:[4-6 Days]],PROVIDER:[TOKEN:[[id number]],FOLLOWUP:[4-6 Days]]

## 2021-11-01 NOTE — ED PROVIDER NOTE - CARE PROVIDERS DIRECT ADDRESSES
,[email or web address],DirectAddress_Unknown

## 2021-11-01 NOTE — ED PROVIDER NOTE - PHYSICAL EXAMINATION
left knee non tender. + pain with ROM. FROM. no deformity. no laxity to ligaments. no effusion. no edema. no erythema. no bruising. distal NVI. remainder of LLE non tender.

## 2021-11-01 NOTE — ED PROVIDER NOTE - NSFOLLOWUPINSTRUCTIONS_ED_ALL_ED_FT
Follow up with orthopedics within one week.                       Knee Sprain    WHAT YOU NEED TO KNOW:    A knee sprain is a stretched or torn ligament in your knee. Ligaments support the knee and keep the joint and bones in the correct position. A knee sprain may involve one or more ligaments.    Knee Anatomy          DISCHARGE INSTRUCTIONS:    Return to the emergency department if:   •Any part of your leg feels cold, numb, or looks pale.          Call your doctor if:   •You have new or increased swelling, bruising, or pain in your knee.      •Your symptoms do not improve within 6 weeks, even with treatment.      •You have questions or concerns about your condition or care.      Medicines:   •NSAIDs, such as ibuprofen, help decrease swelling, pain, and fever. This medicine is available with or without a doctor's order. NSAIDs can cause stomach bleeding or kidney problems in certain people. If you take blood thinner medicine, always ask your healthcare provider if NSAIDs are safe for you. Always read the medicine label and follow directions.      •Acetaminophen decreases pain and fever. It is available without a doctor's order. Ask how much to take and how often to take it. Follow directions. Read the labels of all other medicines you are using to see if they also contain acetaminophen, or ask your doctor or pharmacist. Acetaminophen can cause liver damage if not taken correctly. Do not use more than 4 grams (4,000 milligrams) total of acetaminophen in one day.       •Prescription pain medicine may be given. Ask your healthcare provider how to take this medicine safely. Some prescription pain medicines contain acetaminophen. Do not take other medicines that contain acetaminophen without talking to your healthcare provider. Too much acetaminophen may cause liver damage. Prescription pain medicine may cause constipation. Ask your healthcare provider how to prevent or treat constipation.       •Take your medicine as directed. Contact your healthcare provider if you think your medicine is not helping or if you have side effects. Tell him or her if you are allergic to any medicine. Keep a list of the medicines, vitamins, and herbs you take. Include the amounts, and when and why you take them. Bring the list or the pill bottles to follow-up visits. Carry your medicine list with you in case of an emergency.      A support device such as a splint or brace may be needed. These devices limit movement and protect the joint while it heals. You may be given crutches to use until you can stand on your injured leg without pain.    Physical therapy may be needed. A physical therapist teaches you exercises to help improve movement and strength, and to decrease pain.    Manage a knee sprain:   •Rest your knee and do not exercise. Do not walk on your injured leg if you are told to keep weight off your knee. Rest helps decrease swelling and allows the injury to heal. You can do gentle range of motion exercises as directed to prevent stiffness.      •Apply ice on your knee for 15 to 20 minutes every hour or as directed. Use an ice pack, or put crushed ice in a plastic bag. Cover the bag with a towel before you apply it. Ice helps prevent tissue damage and decreases swelling and pain.      •Apply compression to your knee as directed. You may need to wear an elastic bandage. This helps keep your injured knee from moving too much while it heals. It should be tight enough to give support but so tight that it causes your toes to feel numb or tingly. Take the bandage off and rewrap it at least 1 time each day.  How to Wrap an Elastic Bandage           •Elevate your knee above the level of your heart as often as you can. This will help decrease swelling and pain. Prop your leg on pillows or blankets to keep it elevated comfortably. Do not put pillows directly behind your knee.  Elevate Leg           Prevent another knee sprain: Exercise your legs to keep your muscles strong. Strong leg muscles help protect your knee and prevent strain. The following may also prevent a knee sprain:   •Slowly start your exercise or training program. Slowly increase the time, distance, and intensity of your exercise. Sudden increases in training may cause another knee sprain.      •Wear protective braces and equipment as directed. Braces may prevent your knee from moving the wrong way and causing another sprain. Protective equipment may support your bones and ligaments to prevent injury.      •Warm up and stretch before exercise. Warm up by walking or using an exercise bike before starting your regular exercise. Do gentle stretches after warming up. This helps to loosen your muscles and decrease stress on your knee. Cool down and stretch after you exercise.      •Wear shoes that fit correctly and support your feet. Replace your running or exercise shoes before the padding or shock absorption is worn out. Ask your healthcare provider which exercise shoes are best for you. Ask if you should wear shoe inserts. Shoe inserts can help support your heels and arches or keep your foot lined up correctly in your shoes. Exercise on flat surfaces.      Follow up with your doctor as directed: Write down your questions so you remember to ask them during your visits.       © Copyright [employer] 2021           back to top                          © Copyright [employer] 2021

## 2021-11-01 NOTE — ED PROVIDER NOTE - OBJECTIVE STATEMENT
49 y/o female with no sig pMHX c/o left knee pain x 1month. pt states sharp pain with walking. pt denies trauma. pain progressively worsening over past one month and pt notes felt a "pop" today. no numbness, tingling or weakness. no fever or chills. no cp or sob. no further complaints.

## 2021-11-01 NOTE — ED PROVIDER NOTE - CLINICAL SUMMARY MEDICAL DECISION MAKING FREE TEXT BOX
left knee pain. neurovascular intact. a febrile. do not suspect infection at this time. ? meniscus  vs MCL sprain. x-ray no acute pathology. RICE, motrin, knee immobilizer, cane and f/u with pmd.

## 2021-11-01 NOTE — ED PROVIDER NOTE - PATIENT PORTAL LINK FT
You can access the FollowMyHealth Patient Portal offered by [facility] by registering at the following website: [email or web address]. By joining [employer]’s FollowMyHealth portal, you will also be able to view your health information using other applications (apps) compatible with our system.

## 2021-11-01 NOTE — ED PROVIDER NOTE - CARE PROVIDER_API CALL
John Salazar)  Orthopaedic Surgery  [address] [place] [address]  Phone: [phone]  Fax: [phone]  Follow Up Time: 4-6 Days    Jb Morrison)  Orthopaedic Surgery  [address] [facility], [address]  [place] [address]  Phone: [phone]  Fax: [phone]  Follow Up Time: 4-6 Days

## 2021-11-03 ENCOUNTER — TRANSCRIPTION ENCOUNTER (OUTPATIENT)
Age: 51
End: 2021-11-03

## 2022-02-03 ENCOUNTER — EMERGENCY (EMERGENCY)
Facility: HOSPITAL | Age: 52
LOS: 1 days | Discharge: HOME | End: 2022-02-03
Attending: EMERGENCY MEDICINE | Admitting: EMERGENCY MEDICINE
Payer: MEDICAID

## 2022-02-03 VITALS
HEART RATE: 107 BPM | DIASTOLIC BLOOD PRESSURE: 88 MMHG | SYSTOLIC BLOOD PRESSURE: 148 MMHG | HEIGHT: 64 IN | RESPIRATION RATE: 20 BRPM | WEIGHT: 205.03 LBS | TEMPERATURE: 98 F | OXYGEN SATURATION: 97 %

## 2022-02-03 VITALS
RESPIRATION RATE: 18 BRPM | OXYGEN SATURATION: 97 % | SYSTOLIC BLOOD PRESSURE: 114 MMHG | HEART RATE: 79 BPM | DIASTOLIC BLOOD PRESSURE: 77 MMHG | TEMPERATURE: 99 F

## 2022-02-03 DIAGNOSIS — Z20.822 CONTACT WITH AND (SUSPECTED) EXPOSURE TO COVID-19: ICD-10-CM

## 2022-02-03 DIAGNOSIS — R00.0 TACHYCARDIA, UNSPECIFIED: ICD-10-CM

## 2022-02-03 DIAGNOSIS — R06.00 DYSPNEA, UNSPECIFIED: ICD-10-CM

## 2022-02-03 DIAGNOSIS — J02.9 ACUTE PHARYNGITIS, UNSPECIFIED: ICD-10-CM

## 2022-02-03 DIAGNOSIS — R61 GENERALIZED HYPERHIDROSIS: ICD-10-CM

## 2022-02-03 LAB
ALBUMIN SERPL ELPH-MCNC: 4 G/DL — SIGNIFICANT CHANGE UP (ref 3.3–5)
ALP SERPL-CCNC: 89 U/L — SIGNIFICANT CHANGE UP (ref 40–120)
ALT FLD-CCNC: 25 U/L — SIGNIFICANT CHANGE UP (ref 10–45)
ANION GAP SERPL CALC-SCNC: 14 MMOL/L — SIGNIFICANT CHANGE UP (ref 5–17)
AST SERPL-CCNC: 26 U/L — SIGNIFICANT CHANGE UP (ref 10–40)
BASOPHILS # BLD AUTO: 0.06 K/UL — SIGNIFICANT CHANGE UP (ref 0–0.2)
BASOPHILS NFR BLD AUTO: 0.6 % — SIGNIFICANT CHANGE UP (ref 0–2)
BILIRUB SERPL-MCNC: 0.2 MG/DL — SIGNIFICANT CHANGE UP (ref 0.2–1.2)
BUN SERPL-MCNC: 14 MG/DL — SIGNIFICANT CHANGE UP (ref 7–23)
CALCIUM SERPL-MCNC: 8.9 MG/DL — SIGNIFICANT CHANGE UP (ref 8.4–10.5)
CHLORIDE SERPL-SCNC: 102 MMOL/L — SIGNIFICANT CHANGE UP (ref 96–108)
CO2 SERPL-SCNC: 24 MMOL/L — SIGNIFICANT CHANGE UP (ref 22–31)
CREAT SERPL-MCNC: 0.81 MG/DL — SIGNIFICANT CHANGE UP (ref 0.5–1.3)
D DIMER BLD IA.RAPID-MCNC: <150 NG/ML DDU — SIGNIFICANT CHANGE UP
EOSINOPHIL # BLD AUTO: 0.3 K/UL — SIGNIFICANT CHANGE UP (ref 0–0.5)
EOSINOPHIL NFR BLD AUTO: 3 % — SIGNIFICANT CHANGE UP (ref 0–6)
GLUCOSE SERPL-MCNC: 100 MG/DL — HIGH (ref 70–99)
HCT VFR BLD CALC: 39.5 % — SIGNIFICANT CHANGE UP (ref 34.5–45)
HGB BLD-MCNC: 12.2 G/DL — SIGNIFICANT CHANGE UP (ref 11.5–15.5)
IMM GRANULOCYTES NFR BLD AUTO: 0.4 % — SIGNIFICANT CHANGE UP (ref 0–1.5)
LYMPHOCYTES # BLD AUTO: 3.59 K/UL — HIGH (ref 1–3.3)
LYMPHOCYTES # BLD AUTO: 36 % — SIGNIFICANT CHANGE UP (ref 13–44)
MCHC RBC-ENTMCNC: 24.4 PG — LOW (ref 27–34)
MCHC RBC-ENTMCNC: 30.9 GM/DL — LOW (ref 32–36)
MCV RBC AUTO: 79.2 FL — LOW (ref 80–100)
MONOCYTES # BLD AUTO: 0.95 K/UL — HIGH (ref 0–0.9)
MONOCYTES NFR BLD AUTO: 9.5 % — SIGNIFICANT CHANGE UP (ref 2–14)
NEUTROPHILS # BLD AUTO: 5.02 K/UL — SIGNIFICANT CHANGE UP (ref 1.8–7.4)
NEUTROPHILS NFR BLD AUTO: 50.5 % — SIGNIFICANT CHANGE UP (ref 43–77)
NRBC # BLD: 0 /100 WBCS — SIGNIFICANT CHANGE UP (ref 0–0)
NT-PROBNP SERPL-SCNC: 22 PG/ML — SIGNIFICANT CHANGE UP (ref 0–300)
PLATELET # BLD AUTO: 489 K/UL — HIGH (ref 150–400)
POTASSIUM SERPL-MCNC: 3.9 MMOL/L — SIGNIFICANT CHANGE UP (ref 3.5–5.3)
POTASSIUM SERPL-SCNC: 3.9 MMOL/L — SIGNIFICANT CHANGE UP (ref 3.5–5.3)
PROT SERPL-MCNC: 7.4 G/DL — SIGNIFICANT CHANGE UP (ref 6–8.3)
RBC # BLD: 4.99 M/UL — SIGNIFICANT CHANGE UP (ref 3.8–5.2)
RBC # FLD: 14.8 % — HIGH (ref 10.3–14.5)
SARS-COV-2 RNA SPEC QL NAA+PROBE: SIGNIFICANT CHANGE UP
SODIUM SERPL-SCNC: 140 MMOL/L — SIGNIFICANT CHANGE UP (ref 135–145)
TROPONIN T SERPL-MCNC: 0.01 NG/ML — SIGNIFICANT CHANGE UP (ref 0–0.01)
WBC # BLD: 9.96 K/UL — SIGNIFICANT CHANGE UP (ref 3.8–10.5)
WBC # FLD AUTO: 9.96 K/UL — SIGNIFICANT CHANGE UP (ref 3.8–10.5)

## 2022-02-03 PROCEDURE — 93010 ELECTROCARDIOGRAM REPORT: CPT | Mod: NC

## 2022-02-03 PROCEDURE — 71275 CT ANGIOGRAPHY CHEST: CPT | Mod: MA

## 2022-02-03 PROCEDURE — 71275 CT ANGIOGRAPHY CHEST: CPT | Mod: 26,MA

## 2022-02-03 PROCEDURE — U0005: CPT

## 2022-02-03 PROCEDURE — 84484 ASSAY OF TROPONIN QUANT: CPT

## 2022-02-03 PROCEDURE — U0003: CPT

## 2022-02-03 PROCEDURE — 80053 COMPREHEN METABOLIC PANEL: CPT

## 2022-02-03 PROCEDURE — 99285 EMERGENCY DEPT VISIT HI MDM: CPT

## 2022-02-03 PROCEDURE — 83880 ASSAY OF NATRIURETIC PEPTIDE: CPT

## 2022-02-03 PROCEDURE — 71045 X-RAY EXAM CHEST 1 VIEW: CPT

## 2022-02-03 PROCEDURE — 75574 CT ANGIO HRT W/3D IMAGE: CPT | Mod: MA

## 2022-02-03 PROCEDURE — 85025 COMPLETE CBC W/AUTO DIFF WBC: CPT

## 2022-02-03 PROCEDURE — 71045 X-RAY EXAM CHEST 1 VIEW: CPT | Mod: 26

## 2022-02-03 PROCEDURE — 75574 CT ANGIO HRT W/3D IMAGE: CPT | Mod: 26,MA

## 2022-02-03 PROCEDURE — 93005 ELECTROCARDIOGRAM TRACING: CPT | Mod: XU

## 2022-02-03 PROCEDURE — 99285 EMERGENCY DEPT VISIT HI MDM: CPT | Mod: 25

## 2022-02-03 PROCEDURE — 85379 FIBRIN DEGRADATION QUANT: CPT

## 2022-02-03 PROCEDURE — 36415 COLL VENOUS BLD VENIPUNCTURE: CPT

## 2022-02-03 RX ORDER — METOPROLOL TARTRATE 50 MG
50 TABLET ORAL ONCE
Refills: 0 | Status: COMPLETED | OUTPATIENT
Start: 2022-02-03 | End: 2022-02-03

## 2022-02-03 RX ORDER — METOPROLOL TARTRATE 50 MG
25 TABLET ORAL ONCE
Refills: 0 | Status: COMPLETED | OUTPATIENT
Start: 2022-02-03 | End: 2022-02-03

## 2022-02-03 RX ADMIN — Medication 50 MILLIGRAM(S): at 09:45

## 2022-02-03 RX ADMIN — Medication 25 MILLIGRAM(S): at 11:03

## 2022-02-03 NOTE — ED ADULT NURSE NOTE - PERIPHERAL VASCULAR ED EDEMA
no Erythromycin Counseling:  I discussed with the patient the risks of erythromycin including but not limited to GI upset, allergic reaction, drug rash, diarrhea, increase in liver enzymes, and yeast infections.

## 2022-02-03 NOTE — ED ADULT NURSE NOTE - CHPI ED NUR SYMPTOMS NEG
no back pain/no chest pain/no chills/no congestion/no diaphoresis/no dizziness/no fever/no nausea/no syncope/no vomiting no back pain/no chest pain/no chills/no congestion/no dizziness/no fever/no nausea/no syncope/no vomiting

## 2022-02-03 NOTE — ED PROVIDER NOTE - NS_ATTENDINGSCRIBE_ED_ALL_ED
Patient is a 77yoM with advanced Alzheimers dementia, DM, Afib, AVR (on Coumadin) and BPH who presents from home to [facility] ED with increased lethargy and weakness. I personally performed the service described in the documentation recorded by the scribe in my presence, and it accurately and completely records my words and actions.

## 2022-02-03 NOTE — ED ADULT NURSE NOTE - CHIEF COMPLAINT QUOTE
Patient presents to ER with chief complaints of Elevated HR to 140s since Saturday noted when patient went to [facility] for sore throat. Patient as well reports shortness of breath x 1 month.

## 2022-02-03 NOTE — ED ADULT NURSE NOTE - NSIMPLEMENTINTERV_GEN_ALL_ED
Implemented All Universal Safety Interventions:  Rachel to call system. Call bell, personal items and telephone within reach. Instruct patient to call for assistance. Room bathroom lighting operational. Non-slip footwear when patient is off stretcher. Physically safe environment: no spills, clutter or unnecessary equipment. Stretcher in lowest position, wheels locked, appropriate side rails in place.

## 2022-02-03 NOTE — ED PROVIDER NOTE - CARE PROVIDER_API CALL
Luís Garcia)  Cardiovascular Disease; Internal Medicine  Cardiology [facility], [address] [place] [address]  Phone: [phone]  Fax: [phone]  Follow Up Time:     Angela Young)  Critical Care Medicine; Pulmonary Disease  [address], [address] [place] [id number]  Phone: [phone]  Fax: [phone]  Follow Up Time:

## 2022-02-03 NOTE — ED ADULT TRIAGE NOTE - CHIEF COMPLAINT QUOTE
Patient presents to ER with chief complaints of Elevated HR to 140s since Saturday noted when patient went to [facility] for sore throat. Patient as well reports shortness of breath x 1 month.

## 2022-02-03 NOTE — ED ADULT NURSE NOTE - OBJECTIVE STATEMENT
pt. with no past medical hx presents with c/o shortness of breath for about 1 month getting worse and palpitations on exertion. pt. denies any chest pain, fever, cough, n/v/d, dizziness. ekg in progress. pt. with no past medical hx presents with c/o shortness of breath for about 1 month getting worse and palpitations on exertion. SOB is worse in supine position. pt. also states she has been feeling "hot" and "sweating" even with cold T. last week pt. went to [facility] for sore throat where she was found to have tachycardia. pt. denies any chest pain, fever, cough, n/v/d, dizziness. ekg in progress.

## 2022-02-03 NOTE — ED PROVIDER NOTE - NSFOLLOWUPINSTRUCTIONS_ED_ALL_ED_FT
Please follow up a cardiologist and pulmonologist for re-evaluation and further work up.  Wear ace wrap to your right arm for compression and apply ice.  Return to er for any new or worsening symptoms (worsening difficulty breathing, chest pain, dizziness, passing out...).    Shortness of breath    Shortness of breath (dyspnea) means you have trouble breathing and could indicate a medical problem. Causes include lung disease, heart disease, low amount of red blood cells (anemia), poor physical fitness, being overweight, smoking, etc. Your health care provider today may not be able to find a cause for your shortness of breath after your exam. In this case, it is important to have a follow-up exam with your primary care physician as instructed. If medicines were prescribed, take them as directed for the full length of time directed. Refrain from tobacco products.    SEEK IMMEDIATE MEDICAL CARE IF YOU HAVE ANY OF THE FOLLOWING SYMPTOMS: worsening shortness of breath, chest pain, back pain, abdominal pain, fever, coughing up blood, lightheadedness/dizziness.

## 2022-02-03 NOTE — ED PROVIDER NOTE - CARE PROVIDERS DIRECT ADDRESSES
[email or web address]

## 2022-02-03 NOTE — ED PROVIDER NOTE - OBJECTIVE STATEMENT
52 y/o F, PMHx of HSP, sleep apnea, presents to ED with SOB with exertion and laying supine x1 month. Pt reports sweats, but has no associated chest pain, palpitation, dizziness and syncope. Pt also reports having a sore throat for which she was evaluated at urgent care last week. She had a negative strep and COVID test. At urgent care, pt was told she had rapid heart rate of 140. She was monitored until her rate reduced to 105 bpm after drinking fluids and relaxing. She also denies cough, fever, chills, leg pain, swelling, mobility issues or hx of surgeries. Pt is a nonsmoker and has FH of CAD from mother and grandmother. 50 y/o F, PMHx of HSP, sleep apnea, presents to ED with SOB with exertion and laying supine x1 month. Pt reports sweats, but has no associated chest pain, palpitation, dizziness and syncope. Pt also reports having a sore throat for which she was evaluated at urgent care last week and is now resolved. While at urgent care, she had a negative strep and COVID test. Pt was also found to have rapid heart rate of 140. She was monitored until her rate reduced to 105 bpm after drinking fluids and relaxing. She denies cough, fever, chills, leg pain, swelling, prolonged immobility... Pt is a nonsmoker and has FH of CAD from mother and grandmother.

## 2022-02-03 NOTE — ED PROVIDER NOTE - PATIENT PORTAL LINK FT
You can access the FollowMyHealth Patient Portal offered by [facility] by registering at the following website: [email or web address]. By joining [employer]’s FollowMyHealth portal, you will also be able to view your health information using other applications (apps) compatible with our system.

## 2022-02-03 NOTE — ED PROVIDER NOTE - CLINICAL SUMMARY MEDICAL DECISION MAKING FREE TEXT BOX
Impression: Pt with worsening dyspnea over past month, worse with exertion. Has associated sweats, but no chest pain, chest palpitations, or syncope. Pt is afebrile and in no respiratory distress. Labs are reassuring with normal WBC count, Troponin, d-dimer, and BMP. Chest xray is normal for acute findings. EKG shows sinus tachycardia at 103 with no ischemic changes. Given family hx of CAD and worsening dyspnea, will order cardiac CTA to rule out obstructive CAD and CT chest to rule out PE. Will continue to monitor. Impression: Pt with worsening dyspnea over past month, worse with exertion/ lying supine. Has associated sweats, but no chest pain, palpitations, dizziness or syncope. Pt is afebrile and in no respiratory distress. Labs are reassuring with normal WBC count, Troponin, d-dimer, and bnp. Chest xray is neg for acute findings. EKG shows sinus tachycardia at 103 with no ischemic changes. Given family hx of CAD and worsening dyspnea, will order cardiac CTA to rule out obstructive CAD and CTA chest to rule out PE. Will continue to monitor.    CTA neg for pe or obstructive cad. ED evaluation and management discussed with the patient in detail.  Of note, IV to rue infiltrated while at ct with resultant swelling to rue. Ice and compression wrap placed to rue. NVI. Close PMD/ cards/ pulm follow up encouraged.  Strict ED return instructions discussed in detail and patient given the opportunity to ask any questions about their discharge diagnosis and instructions. Patient verbalized understanding.

## 2022-02-03 NOTE — ED PROVIDER NOTE - PHYSICAL EXAMINATION
VITAL SIGNS: I have reviewed nursing notes and confirm.  CONSTITUTIONAL: Well appearing, in no acute distress.   SKIN:  warm and dry, no acute rash.   HEAD:  normocephalic, atraumatic.  EYES: EOM intact; conjunctiva and sclera clear.  ENT: No nasal discharge; airway clear.   NECK: Supple; non tender.  CARD: S1, S2 normal; no murmurs, gallops, or rubs. Regular rate and rhythm.   RESP:  Clear to auscultation b/l, no wheezes, rales or rhonchi.  ABD: Normal bowel sounds; soft; non-distended; non-tender; no guarding/ rebound.  EXT: Normal ROM. No clubbing, cyanosis or edema. 2+ pulses to b/l ue/le. Extremities nontender, no calf tenderness or cords.   NEURO: Alert, oriented, grossly unremarkable  PSYCH: Cooperative, mood and affect appropriate.

## 2022-02-09 PROBLEM — G47.30 SLEEP APNEA, UNSPECIFIED: Chronic | Status: ACTIVE | Noted: 2022-02-04

## 2022-02-16 ENCOUNTER — RESULT CHARGE (OUTPATIENT)
Age: 52
End: 2022-02-16

## 2022-02-17 ENCOUNTER — NON-APPOINTMENT (OUTPATIENT)
Age: 52
End: 2022-02-17

## 2022-02-17 ENCOUNTER — APPOINTMENT (OUTPATIENT)
Dept: HEART AND VASCULAR | Facility: CLINIC | Age: 52
End: 2022-02-17
Payer: MEDICAID

## 2022-02-17 VITALS
DIASTOLIC BLOOD PRESSURE: 79 MMHG | HEIGHT: 64 IN | SYSTOLIC BLOOD PRESSURE: 115 MMHG | HEART RATE: 102 BPM | BODY MASS INDEX: 36.54 KG/M2 | WEIGHT: 214 LBS | OXYGEN SATURATION: 97 %

## 2022-02-17 PROCEDURE — 99204 OFFICE O/P NEW MOD 45 MIN: CPT

## 2022-02-18 NOTE — HISTORY OF PRESENT ILLNESS
[FreeTextEntry1] : PMD: Sofia Riojas PMD (will establish care with MD in a few weeks)\par \par 51 YOF Henoch-Schönlein purpura and sleep apnea (non compliant with CPAP) here for initial evaluation and hospital follow  up\par Patient reports she was sent to the ED for tachycardia and SOB. Negative PE workup, cCTA WNL (Calcium score 0), negative tronopin\par Patient reports worsening SOB over the past two months. Reports she becomes SOB after walking one block. \par Denies CP/syncope/presyncope/palpitations/LE edema \par Drinks 4-5 cups of coffee a day, does not drink much water \par  \par PMHX\par Henoch-Schönlein purpura\par sleep apnea, non compliant with cpap\par Henoch-Schönlein purpura\par  insomnia\par \par PSH\par none\par \par ALL\par none\par \par MEDS\par Cybalta 30mg daily\par trazadone prn insomnia\par \par FH\par mother DM , HTN, CABG X2 in 60's, valve replacement \par grandmother MI in 70's \par \par SH\par no  smoking, nop etoh , no illicit drugs\par \par cCTA 2/3/2022\par Total Agatston Score: 0.\par LM Agatston Score: 0.\par LAD Agatston Score: 0.\par LCX Agatston Score: 0.\par RCA Agatston Score: 0.\par \par CTA Chest PE protocol\par IMPRESSION: No visualized PE.\par \par Labs 2/3/2022\par Cr 0.81\par Troponin 0.01\par \par EKG 2/17/2022 ST  BPM, low voltage in precordial leads

## 2022-02-18 NOTE — ASSESSMENT
[FreeTextEntry1] : 51 YOF Henoch-Schönlein purpura,  sleep apnea, obesity  (non compliant with CPAP) here for initial evaluation and hospital follow up\par \par Abnormal EKG\par -EKG today is ST,  BPM, denies palpitations, +HOLLIS\par -in setting of tachycardia and HOLLIS,  will order a Holter monitor to exclude arrhythmia\par -echocardiogram to r/o any WMA or any valvular pathology\par -recommend to limit the amount of caffeine \par -TSH today \par \par HOLLIS\par -possibly multifactorial\par -cCTA 2/4/2022 WNL \par -echo to r/o any valvular pathology and cardiomyopathy\par \par HTN\par -well controlled on no meds\par -will obtain echocardiogram to r/o and wall motion abnormalities\par -Low salt diet and exercise recommended\par \par \par F/U in 2-3 weeks for echo/holter results and symptom assessment

## 2022-03-14 ENCOUNTER — RESULT REVIEW (OUTPATIENT)
Age: 52
End: 2022-03-14

## 2022-03-14 ENCOUNTER — OUTPATIENT (OUTPATIENT)
Dept: OUTPATIENT SERVICES | Facility: HOSPITAL | Age: 52
LOS: 1 days | End: 2022-03-14
Payer: MEDICAID

## 2022-03-14 ENCOUNTER — APPOINTMENT (OUTPATIENT)
Dept: ORTHOPEDIC SURGERY | Facility: CLINIC | Age: 52
End: 2022-03-14
Payer: MEDICAID

## 2022-03-14 ENCOUNTER — APPOINTMENT (OUTPATIENT)
Dept: PULMONOLOGY | Facility: CLINIC | Age: 52
End: 2022-03-14
Payer: MEDICAID

## 2022-03-14 VITALS
BODY MASS INDEX: 36.54 KG/M2 | SYSTOLIC BLOOD PRESSURE: 119 MMHG | OXYGEN SATURATION: 96 % | DIASTOLIC BLOOD PRESSURE: 84 MMHG | TEMPERATURE: 97.6 F | WEIGHT: 214 LBS | HEART RATE: 107 BPM | HEIGHT: 64 IN

## 2022-03-14 DIAGNOSIS — R00.0 TACHYCARDIA, UNSPECIFIED: ICD-10-CM

## 2022-03-14 DIAGNOSIS — S83.242A OTHER TEAR OF MEDIAL MENISCUS, CURRENT INJURY, LEFT KNEE, INITIAL ENCOUNTER: ICD-10-CM

## 2022-03-14 PROCEDURE — 99204 OFFICE O/P NEW MOD 45 MIN: CPT

## 2022-03-14 PROCEDURE — 73564 X-RAY EXAM KNEE 4 OR MORE: CPT

## 2022-03-14 PROCEDURE — 73564 X-RAY EXAM KNEE 4 OR MORE: CPT | Mod: 26,LT,RT

## 2022-03-14 PROCEDURE — 20610 DRAIN/INJ JOINT/BURSA W/O US: CPT | Mod: LT

## 2022-03-14 PROCEDURE — 99215 OFFICE O/P EST HI 40 MIN: CPT | Mod: 25

## 2022-03-14 RX ORDER — FLUOXETINE HYDROCHLORIDE 40 MG/1
40 CAPSULE ORAL
Refills: 0 | Status: DISCONTINUED | COMMUNITY
End: 2022-03-14

## 2022-03-14 RX ORDER — DULOXETINE HYDROCHLORIDE 60 MG/1
60 CAPSULE, DELAYED RELEASE PELLETS ORAL
Qty: 30 | Refills: 0 | Status: ACTIVE | COMMUNITY
Start: 2022-02-28

## 2022-03-14 RX ORDER — FLUOXETINE HYDROCHLORIDE 20 MG/1
20 CAPSULE ORAL
Qty: 90 | Refills: 0 | Status: DISCONTINUED | COMMUNITY
Start: 2020-09-07 | End: 2022-03-14

## 2022-03-14 NOTE — PROCEDURE
[de-identified] : The left knee was prepped with alchohol and ethyl chloride was used to numb the skin. A 3 cc injection with 1 cc xylocaine, 1cc sensoricaine and 1 cc depomedrol , was given without complication into the knee joint. Patient instructed that there may be an inflammatory flare for 24 hrs , to use ice or advil if needed\par

## 2022-03-14 NOTE — HISTORY OF PRESENT ILLNESS
[FreeTextEntry1] : Pleasant 51-year-old woman who is here for a duration and treatment for sleep disordered breathing. She tells me she was diagnosed with obstructive sleep apnea in the fall of 2018 with home sleep testing. He had been complaining of snoring, witnessed apnea, and daytime sleepiness.  She used CPAP fairly reliably for about one year, was less reliable until recently when she started using it once again over the last several months. She does feel better using it and not using it, but has been having problems with mask discomfort and a mild rash. She's using a fullface mask. She has a Where where machine, which was recalled last June but she was not informed of this. She has not received new supplies in a few years.\par \par She goes to bed at 10 PM, full sleep at about 15 minutes with the use of trazodone  mg each night. She generally gets up at 9 AM, can't sleep until 11 AM on days off. She does not report awakening during the night using trazodone.\par \par She is also treated for depression on Cymbalta 60 mg once a day. She has a history of migraine headaches. She has never smoked.\par \par She has been evaluated for episodes of rapid heartbeat with palpitations. This appears to be sinus tachycardia, and she has had extensive cardiac evaluation including cardiac CT which is normal.  Normal thyroid function.\par \par her durable medical equipment provider is [facility] [phone]

## 2022-03-14 NOTE — DISCUSSION/SUMMARY
[de-identified] : 51 year old female heavy set with knee stiffness and ache. Normal x-rays positive medial meniscus tear on mRI but no mechanical symtpoms and neg Chapincito. \par Plan \par cortisone inj\par Physical therapy \par F/U 5-6 weeks

## 2022-03-14 NOTE — PHYSICAL EXAM
[General Appearance - Well Developed] : well developed [Normal Appearance] : normal appearance [Well Groomed] : well groomed [General Appearance - Well Nourished] : well nourished [No Deformities] : no deformities [General Appearance - In No Acute Distress] : no acute distress [Normal Conjunctiva] : the conjunctiva exhibited no abnormalities [Eyelids - No Xanthelasma] : the eyelids demonstrated no xanthelasmas [Normal Oropharynx] : normal oropharynx [Low Lying Soft Palate] : low lying soft palate [III] : III [Neck Appearance] : the appearance of the neck was normal [Apical Impulse] : the apical impulse was normal [Heart Rate And Rhythm] : heart rate was normal and rhythm regular [Heart Sounds] : normal S1 and S2 [Auscultation Breath Sounds / Voice Sounds] : lungs were clear to auscultation bilaterally [Abnormal Walk] : normal gait [Musculoskeletal - Swelling] : no joint swelling seen [Motor Tone] : muscle strength and tone were normal [Nail Clubbing] : no clubbing of the fingernails [Cyanosis, Localized] : no localized cyanosis [Petechial Hemorrhages (___cm)] : no petechial hemorrhages [] : no ischemic changes [FreeTextEntry1] : mild malar erythema, likely in area of full mask contact [Deep Tendon Reflexes (DTR)] : deep tendon reflexes were 2+ and symmetric [Sensation] : the sensory exam was normal to light touch and pinprick [No Focal Deficits] : no focal deficits [Oriented To Time, Place, And Person] : oriented to person, place, and time [Impaired Insight] : insight and judgment were intact [Affect] : the affect was normal

## 2022-03-14 NOTE — ASSESSMENT
[FreeTextEntry1] : obstructive sleep apnea by hx\par No evaluation 3 yrs, will get unattended home sleep testing now.  May need new equipment, should call [employer] to get  involved in the recall.  gave her undernose mask today- should be more comfortable and minimize rash. Need to get info re: original rx.  F/U after unattended home sleep testing, may need all new rx. \par \par Will optimize her rx for obstructive sleep apnea but do not believe this is related to etiology of her tachycardia.

## 2022-03-14 NOTE — PHYSICAL EXAM
[de-identified] : Medial joint mild tenderness only . ROM 0-140 neg Chapincito and Lachman and Anthony\par No effusion today.  [de-identified] : 4 vies of the knee show normal joint surfaces and no narrowing

## 2022-03-14 NOTE — HISTORY OF PRESENT ILLNESS
[de-identified] : Ashley is a  who had been laid off during Covid. She had a slow insides onset of pain in medial knee with morning stiffness starting in september then had a significant exacerbation and sudden pain with a single leg stance putting on her pants. after that she limped for a short time and was seen by Dr Epstein who ordered MRI which showed a medial meniscus radial tear near the root. \par MRI report is brought today but no images. She has had no treatment to date. \par

## 2022-03-22 LAB — TSH SERPL-ACNC: 0.83 UIU/ML

## 2022-03-25 ENCOUNTER — APPOINTMENT (OUTPATIENT)
Dept: SLEEP CENTER | Facility: HOME HEALTH | Age: 52
End: 2022-03-25
Payer: MEDICAID

## 2022-03-29 ENCOUNTER — OUTPATIENT (OUTPATIENT)
Dept: OUTPATIENT SERVICES | Facility: HOSPITAL | Age: 52
LOS: 1 days | End: 2022-03-29
Payer: MEDICAID

## 2022-03-29 DIAGNOSIS — R00.0 TACHYCARDIA, UNSPECIFIED: ICD-10-CM

## 2022-03-29 PROCEDURE — 93306 TTE W/DOPPLER COMPLETE: CPT

## 2022-03-29 PROCEDURE — 93306 TTE W/DOPPLER COMPLETE: CPT | Mod: 26

## 2022-03-31 ENCOUNTER — APPOINTMENT (OUTPATIENT)
Dept: HEART AND VASCULAR | Facility: CLINIC | Age: 52
End: 2022-03-31
Payer: MEDICAID

## 2022-03-31 VITALS
DIASTOLIC BLOOD PRESSURE: 84 MMHG | BODY MASS INDEX: 36.37 KG/M2 | HEIGHT: 64 IN | WEIGHT: 213 LBS | SYSTOLIC BLOOD PRESSURE: 117 MMHG | TEMPERATURE: 97.1 F | HEART RATE: 109 BPM | OXYGEN SATURATION: 95 %

## 2022-03-31 PROCEDURE — 99214 OFFICE O/P EST MOD 30 MIN: CPT

## 2022-03-31 NOTE — ASSESSMENT
[FreeTextEntry1] : 51 YOF Henoch-Schönlein purpura,  sleep apnea, obesity  (non compliant with CPAP) here for initial evaluation and hospital follow up\par \par Abnormal EKG\par -Patient resolved\par -EKG 2/17/2022 is ST,  BPM\par -Holter; baseline rhythm is SR. The average HR was 98 BPM; total number of ventricular ectopy was 7 representing 0.00% of total beats. 1 couplet noted.; No SV ectopy; No pauses; No Afib \par -echo WNL as above\par -recommend again to limit the amount of caffeine \par -TSH 0.83 3/25/2022\par \par HOLLIS\par -likely due to deconditioning and recent weight gain\par -cCTA 2/4/2022 WNL \par -echo 3/29/2022 WNL\par -diet/exercise discussed\par -advised to continue f/u with pulm and PMD to r/o any non cardiologic causes of HOLLIS\par \par HTN\par -well controlled on no meds\par -Echo WNL as above\par -Low salt diet and exercise recommended\par \par \par F/U in 6 months

## 2022-03-31 NOTE — HISTORY OF PRESENT ILLNESS
[FreeTextEntry1] : PMD: Sofia Riojas PMD \par \par 51 YOF Henoch-Schönlein purpura and sleep apnea (non compliant with CPAP) here for follow up and echo/Holter results\par \par Can walk 10 blocks or two flights of stairs slowly 2/2 SOB\par No CP/syncope/presyncope/palpitations/LE edema/orthopnea\par Patient reports she stopped drinking energy drinks ( Red Bull) , but drinks multiple cups of tea day and soda.\par No CP/syncope/presyncope/palpitations/LE edema/orthopnea\par Currently following up with pulmonology for known SANDRA\par \par As per previous visit\par Patient reports she was sent to the ED for tachycardia and SOB. Negative PE workup, cCTA WNL (Calcium score 0), negative tronopin\par Patient reports worsening SOB over the past two months. Reports she becomes SOB after walking one block. \par \par PMHX\par Henoch-Schönlein purpura\par sleep apnea, non compliant with cpap\par Henoch-Schönlein purpura\par insomnia\par \par PSH\par none\par \par ALL\par none\par \par MEDS\par Cybalta 30mg daily\par trazadone prn insomnia\par \par FH\par mother DM , HTN, CABG X2 in 60's, valve replacement \par grandmother MI in 70's \par \par SH\par no  smoking, nop etoh , no illicit drugs\par \par cCTA 2/3/2022\par Total Agatston Score: 0.\par Normal coronaries\par \par CTA Chest PE protocol\par IMPRESSION: No visualized PE.\par \par Holter 2/17/2022- 2/20/2022\par -baseline rhythm is SR. The average HR was 98 BPM with a minimum of 70 BPM and maximum of of 154 BPM\par -total number of ventricular ectopy was 7 representing 0.00% of total beats. 1 couplet noted.\par No SV ectopy\par No pauses\par No Afib episodes\par \par Echocardiogram 3/29/2022\par EF 57%\par Normal left and right ventricular size and systolic function\par No significant valvular disease\par No evidence of pHTN, PASP is 20mmHg\par No pericardial effusion\par \par \par Labs 2/3/2022\par Cr 0.81\par Troponin 0.01\par \par EKG 2/17/2022 ST  BPM, low voltage in precordial leads

## 2022-04-13 ENCOUNTER — APPOINTMENT (OUTPATIENT)
Dept: SLEEP CENTER | Facility: HOME HEALTH | Age: 52
End: 2022-04-13
Payer: MEDICAID

## 2022-04-13 ENCOUNTER — OUTPATIENT (OUTPATIENT)
Dept: OUTPATIENT SERVICES | Facility: HOSPITAL | Age: 52
LOS: 1 days | End: 2022-04-13
Payer: MEDICAID

## 2022-04-13 PROCEDURE — 95800 SLP STDY UNATTENDED: CPT | Mod: 26

## 2022-04-13 PROCEDURE — 95800 SLP STDY UNATTENDED: CPT

## 2022-04-15 DIAGNOSIS — G47.33 OBSTRUCTIVE SLEEP APNEA (ADULT) (PEDIATRIC): ICD-10-CM

## 2022-04-25 ENCOUNTER — APPOINTMENT (OUTPATIENT)
Dept: PULMONOLOGY | Facility: CLINIC | Age: 52
End: 2022-04-25
Payer: MEDICAID

## 2022-04-25 VITALS
HEART RATE: 109 BPM | WEIGHT: 217 LBS | HEIGHT: 64 IN | DIASTOLIC BLOOD PRESSURE: 83 MMHG | BODY MASS INDEX: 37.05 KG/M2 | TEMPERATURE: 98.8 F | RESPIRATION RATE: 12 BRPM | OXYGEN SATURATION: 97 % | SYSTOLIC BLOOD PRESSURE: 128 MMHG

## 2022-04-25 PROCEDURE — 99214 OFFICE O/P EST MOD 30 MIN: CPT

## 2022-04-26 NOTE — ASSESSMENT
[FreeTextEntry1] : sleep disordered breathing\par No evidence that she has severe enough obstructive sleep apnea to warrant treatment at this time other than attempts at weight loss and avoidance of sedating substances close to bedtime. Suggest hold further rx with CPAP unless has sx of excessive daytime somnolence or has weight gain. \par \par dyspnea\par Suspect deconditioned, and/or related to obesity.  Will get pulmonary function testing to assess

## 2022-04-26 NOTE — HISTORY OF PRESENT ILLNESS
[FreeTextEntry1] : 04/25/2022 :  JACLYN CASTELLANOS is a 51 year old female who is being evaluated for sleep disordered breathing.  ON CPAP in past, dx 2018 with unattended home sleep testing, never completely comfortable with CPAP.  Restudied with unattended home sleep testing here 4/13/22 - reviewed w pt today. Minimal sleep disordered breathing with Apnea Hypopnea Index 5.4 (3% desats) and no time below 90% saturation; good sleep TYST 8h 4 min. Had not used CPAP for weeks before study. \par \par Also evaluated for recent complaints of tachycardia and dyspnea on exertion.  Never smoker, no MDI use, no cough or wheeze. Had recent CT angio negative for PE and normal lung parenchyma (reviewed)

## 2022-04-26 NOTE — PHYSICAL EXAM
[General Appearance - Well Developed] : well developed [Normal Appearance] : normal appearance [Well Groomed] : well groomed [General Appearance - Well Nourished] : well nourished [No Deformities] : no deformities [General Appearance - In No Acute Distress] : no acute distress [Normal Oropharynx] : normal oropharynx [Low Lying Soft Palate] : low lying soft palate [III] : III [Heart Rate And Rhythm] : heart rate was normal and rhythm regular [Heart Sounds] : normal S1 and S2 [Heart Sounds Gallop] : no gallops [Murmurs] : no murmurs [Heart Sounds Pericardial Friction Rub] : no pericardial rub [Auscultation Breath Sounds / Voice Sounds] : lungs were clear to auscultation bilaterally [Nail Clubbing] : no clubbing of the fingernails [Cyanosis, Localized] : no localized cyanosis [Petechial Hemorrhages (___cm)] : no petechial hemorrhages [] : no ischemic changes [FreeTextEntry2] : no edema

## 2022-05-02 ENCOUNTER — APPOINTMENT (OUTPATIENT)
Dept: ORTHOPEDIC SURGERY | Facility: CLINIC | Age: 52
End: 2022-05-02

## 2022-05-25 ENCOUNTER — TRANSCRIPTION ENCOUNTER (OUTPATIENT)
Age: 52
End: 2022-05-25

## 2022-05-26 ENCOUNTER — NON-APPOINTMENT (OUTPATIENT)
Age: 52
End: 2022-05-26

## 2022-05-31 LAB — SARS-COV-2 N GENE NPH QL NAA+PROBE: NOT DETECTED

## 2022-06-01 ENCOUNTER — APPOINTMENT (OUTPATIENT)
Dept: PULMONOLOGY | Facility: CLINIC | Age: 52
End: 2022-06-01
Payer: MEDICAID

## 2022-06-01 VITALS
TEMPERATURE: 98.2 F | BODY MASS INDEX: 37.56 KG/M2 | OXYGEN SATURATION: 97 % | HEIGHT: 64 IN | RESPIRATION RATE: 12 BRPM | WEIGHT: 220 LBS | DIASTOLIC BLOOD PRESSURE: 84 MMHG | HEART RATE: 97 BPM | SYSTOLIC BLOOD PRESSURE: 121 MMHG

## 2022-06-01 DIAGNOSIS — R06.00 DYSPNEA, UNSPECIFIED: ICD-10-CM

## 2022-06-01 DIAGNOSIS — G47.33 OBSTRUCTIVE SLEEP APNEA (ADULT) (PEDIATRIC): ICD-10-CM

## 2022-06-01 PROCEDURE — 94010 BREATHING CAPACITY TEST: CPT

## 2022-06-01 PROCEDURE — 99214 OFFICE O/P EST MOD 30 MIN: CPT | Mod: 25

## 2022-06-01 PROCEDURE — 94729 DIFFUSING CAPACITY: CPT

## 2022-06-01 PROCEDURE — 94727 GAS DIL/WSHOT DETER LNG VOL: CPT

## 2022-06-01 PROCEDURE — ZZZZZ: CPT

## 2022-06-01 NOTE — PHYSICAL EXAM
[General Appearance - Well Developed] : well developed [Normal Appearance] : normal appearance [Well Groomed] : well groomed [General Appearance - Well Nourished] : well nourished [No Deformities] : no deformities [General Appearance - In No Acute Distress] : no acute distress [Normal Oropharynx] : normal oropharynx [Low Lying Soft Palate] : low lying soft palate [III] : III [] : no respiratory distress [Auscultation Breath Sounds / Voice Sounds] : lungs were clear to auscultation bilaterally

## 2022-06-01 NOTE — HISTORY OF PRESENT ILLNESS
[FreeTextEntry1] : 04/25/2022 :  JACLYN CASTELLANOS is a 51 year old female who is being evaluated for sleep disordered breathing.  ON CPAP in past, dx 2018 with unattended home sleep testing, never completely comfortable with CPAP.  Restudied with unattended home sleep testing here 4/13/22 - reviewed w pt today. Minimal sleep disordered breathing with Apnea Hypopnea Index 5.4 (3% desats) and no time below 90% saturation; good sleep TYST 8h 4 min. Had not used CPAP for weeks before study. \par \par Also evaluated for recent complaints of tachycardia and dyspnea on exertion.  Never smoker, no MDI use, no cough or wheeze. Had recent CT angio negative for PE and normal lung parenchyma (reviewed)\par \par 6/1/22:  here for results of testing- no new complaints\par \par pulmonary function testing today: mild volume changes probably c/w obesity- reduced ERV, reduced DLCO normalizes with Va.  6 minute walk 439m, no oxygen desaturation\par \par unattended home sleep testing 4/13/22 minimal obstructive sleep apnea with Apnea Hypopnea Index 5.4 (3% criterion), no oxygen desaturations \par

## 2022-06-01 NOTE — ASSESSMENT
[FreeTextEntry1] : 1) dyspnea\par Reassured, likely from obesity and deconditioning, advised to exercise and try to lose weight\par \par 2) obstructive sleep apnea\par mild- doubt needs treatment, try life style changes as above.  Although unattended home sleep testing can underestimate sleep disordered breathing she has really felt no worse off treatment.  Stay off CPAP for now, re-evaluate in 4-6 months

## 2022-09-19 ENCOUNTER — APPOINTMENT (OUTPATIENT)
Dept: HEART AND VASCULAR | Facility: CLINIC | Age: 52
End: 2022-09-19

## 2022-10-24 ENCOUNTER — APPOINTMENT (OUTPATIENT)
Dept: PULMONOLOGY | Facility: CLINIC | Age: 52
End: 2022-10-24

## 2022-10-24 VITALS
DIASTOLIC BLOOD PRESSURE: 85 MMHG | TEMPERATURE: 97.3 F | HEART RATE: 105 BPM | HEIGHT: 64 IN | OXYGEN SATURATION: 97 % | BODY MASS INDEX: 38.07 KG/M2 | WEIGHT: 223 LBS | RESPIRATION RATE: 12 BRPM | SYSTOLIC BLOOD PRESSURE: 139 MMHG

## 2022-10-24 PROCEDURE — 99213 OFFICE O/P EST LOW 20 MIN: CPT

## 2022-10-24 RX ORDER — TRAZODONE HYDROCHLORIDE 300 MG/1
TABLET ORAL
Refills: 0 | Status: COMPLETED | COMMUNITY
End: 2022-10-24

## 2022-10-24 NOTE — HISTORY OF PRESENT ILLNESS
[FreeTextEntry1] : 04/25/2022 :  JACLYN CASTELLANOS is a 51 year old female who is being evaluated for sleep disordered breathing.  ON CPAP in past, dx 2018 with unattended home sleep testing, never completely comfortable with CPAP.  Restudied with unattended home sleep testing here 4/13/22 - reviewed w pt today. Minimal sleep disordered breathing with Apnea Hypopnea Index 5.4 (3% desats) and no time below 90% saturation; good sleep TYST 8h 4 min. Had not used CPAP for weeks before study. \par \par Also evaluated for recent complaints of tachycardia and dyspnea on exertion.  Never smoker, no MDI use, no cough or wheeze. Had recent CT angio negative for PE and normal lung parenchyma (reviewed)\par \par 6/1/22:  here for results of testing- no new complaints\par \par pulmonary function testing today: mild volume changes probably c/w obesity- reduced ERV, reduced DLCO normalizes with Va.  6 minute walk 439m, no oxygen desaturation\par \par unattended home sleep testing 4/13/22 minimal obstructive sleep apnea with Apnea Hypopnea Index 5.4 (3% criterion), no oxygen desaturations \par \par 10/24/2022: Follow-up visit for mild sleep apnea.  Since last seen she obtained a replacement CPAP unit from the [employer] and has started using it.  She is using this mostly to treat snoring, is not clear whether her sleep is improved with it.  Her weight has increased 10 pounds in the past year.   No significant medical history or changes in medication since last visit.

## 2022-10-24 NOTE — ASSESSMENT
[FreeTextEntry1] : Mild sleep disordered breathing\par \par She may or may not require treatment but could certainly use this if mostly to treat snoring.  Get a download of compliance from her homecare company, [employer], and sent them a prescription for continued use.

## 2022-10-24 NOTE — PHYSICAL EXAM
[General Appearance - Well Developed] : well developed [Normal Appearance] : normal appearance [Well Groomed] : well groomed [General Appearance - Well Nourished] : well nourished [No Deformities] : no deformities [General Appearance - In No Acute Distress] : no acute distress [Normal Oropharynx] : normal oropharynx [Low Lying Soft Palate] : low lying soft palate [III] : III [Auscultation Breath Sounds / Voice Sounds] : lungs were clear to auscultation bilaterally [Abnormal Walk] : normal gait [Musculoskeletal - Swelling] : no joint swelling seen [Motor Tone] : muscle strength and tone were normal [Nail Clubbing] : no clubbing of the fingernails [Cyanosis, Localized] : no localized cyanosis [Petechial Hemorrhages (___cm)] : no petechial hemorrhages [] : no ischemic changes

## 2022-11-18 NOTE — ED ADULT NURSE NOTE - NEURO WDL
negative
Alert and oriented to person, place and time, memory intact, behavior appropriate to situation,

## 2023-09-18 ENCOUNTER — APPOINTMENT (OUTPATIENT)
Dept: ORTHOPEDIC SURGERY | Facility: CLINIC | Age: 53
End: 2023-09-18
Payer: MEDICAID

## 2023-09-18 DIAGNOSIS — M22.2X1 PATELLOFEMORAL DISORDERS, RIGHT KNEE: ICD-10-CM

## 2023-09-18 DIAGNOSIS — M22.2X2 PATELLOFEMORAL DISORDERS, RIGHT KNEE: ICD-10-CM

## 2023-09-18 PROCEDURE — 99214 OFFICE O/P EST MOD 30 MIN: CPT

## 2024-03-12 ENCOUNTER — EMERGENCY (EMERGENCY)
Facility: HOSPITAL | Age: 54
LOS: 1 days | Discharge: HOME | End: 2024-03-12
Admitting: STUDENT IN AN ORGANIZED HEALTH CARE EDUCATION/TRAINING PROGRAM
Payer: COMMERCIAL

## 2024-03-12 VITALS
RESPIRATION RATE: 17 BRPM | DIASTOLIC BLOOD PRESSURE: 82 MMHG | SYSTOLIC BLOOD PRESSURE: 126 MMHG | HEIGHT: 64 IN | TEMPERATURE: 99 F | WEIGHT: 210.1 LBS | OXYGEN SATURATION: 98 % | HEART RATE: 92 BPM

## 2024-03-12 LAB
ANION GAP SERPL CALC-SCNC: 11 MMOL/L — SIGNIFICANT CHANGE UP (ref 5–17)
APPEARANCE UR: CLEAR — SIGNIFICANT CHANGE UP
BACTERIA # UR AUTO: ABNORMAL /HPF
BASOPHILS # BLD AUTO: 0.03 K/UL — SIGNIFICANT CHANGE UP (ref 0–0.2)
BASOPHILS NFR BLD AUTO: 0.2 % — SIGNIFICANT CHANGE UP (ref 0–2)
BILIRUB UR-MCNC: ABNORMAL
BUN SERPL-MCNC: 9 MG/DL — SIGNIFICANT CHANGE UP (ref 7–23)
CALCIUM SERPL-MCNC: 9.9 MG/DL — SIGNIFICANT CHANGE UP (ref 8.4–10.5)
CAST: 0 /LPF — SIGNIFICANT CHANGE UP (ref 0–4)
CHLORIDE SERPL-SCNC: 102 MMOL/L — SIGNIFICANT CHANGE UP (ref 96–108)
CO2 SERPL-SCNC: 24 MMOL/L — SIGNIFICANT CHANGE UP (ref 22–31)
COLOR SPEC: ABNORMAL
CREAT SERPL-MCNC: 0.74 MG/DL — SIGNIFICANT CHANGE UP (ref 0.5–1.3)
DIFF PNL FLD: ABNORMAL
EGFR: 97 ML/MIN/1.73M2 — SIGNIFICANT CHANGE UP
EOSINOPHIL # BLD AUTO: 0.48 K/UL — SIGNIFICANT CHANGE UP (ref 0–0.5)
EOSINOPHIL NFR BLD AUTO: 3.4 % — SIGNIFICANT CHANGE UP (ref 0–6)
GLUCOSE SERPL-MCNC: 102 MG/DL — HIGH (ref 70–99)
GLUCOSE UR QL: NEGATIVE MG/DL — SIGNIFICANT CHANGE UP
HCT VFR BLD CALC: 40.7 % — SIGNIFICANT CHANGE UP (ref 34.5–45)
HGB BLD-MCNC: 13 G/DL — SIGNIFICANT CHANGE UP (ref 11.5–15.5)
IMM GRANULOCYTES NFR BLD AUTO: 0.3 % — SIGNIFICANT CHANGE UP (ref 0–0.9)
KETONES UR-MCNC: NEGATIVE MG/DL — SIGNIFICANT CHANGE UP
LEUKOCYTE ESTERASE UR-ACNC: ABNORMAL
LYMPHOCYTES # BLD AUTO: 33.2 % — SIGNIFICANT CHANGE UP (ref 13–44)
LYMPHOCYTES # BLD AUTO: 4.75 K/UL — HIGH (ref 1–3.3)
MCHC RBC-ENTMCNC: 25.2 PG — LOW (ref 27–34)
MCHC RBC-ENTMCNC: 31.9 GM/DL — LOW (ref 32–36)
MCV RBC AUTO: 79 FL — LOW (ref 80–100)
MONOCYTES # BLD AUTO: 1.13 K/UL — HIGH (ref 0–0.9)
MONOCYTES NFR BLD AUTO: 7.9 % — SIGNIFICANT CHANGE UP (ref 2–14)
NEUTROPHILS # BLD AUTO: 7.85 K/UL — HIGH (ref 1.8–7.4)
NEUTROPHILS NFR BLD AUTO: 55 % — SIGNIFICANT CHANGE UP (ref 43–77)
NITRITE UR-MCNC: POSITIVE
NRBC # BLD: 0 /100 WBCS — SIGNIFICANT CHANGE UP (ref 0–0)
PH UR: 5 — SIGNIFICANT CHANGE UP (ref 5–8)
PLATELET # BLD AUTO: 395 K/UL — SIGNIFICANT CHANGE UP (ref 150–400)
POTASSIUM SERPL-MCNC: 4.4 MMOL/L — SIGNIFICANT CHANGE UP (ref 3.5–5.3)
POTASSIUM SERPL-SCNC: 4.4 MMOL/L — SIGNIFICANT CHANGE UP (ref 3.5–5.3)
PROT UR-MCNC: NEGATIVE MG/DL — SIGNIFICANT CHANGE UP
RBC # BLD: 5.15 M/UL — SIGNIFICANT CHANGE UP (ref 3.8–5.2)
RBC # FLD: 15 % — HIGH (ref 10.3–14.5)
RBC CASTS # UR COMP ASSIST: 9 /HPF — HIGH (ref 0–4)
SODIUM SERPL-SCNC: 137 MMOL/L — SIGNIFICANT CHANGE UP (ref 135–145)
SP GR SPEC: 1.02 — SIGNIFICANT CHANGE UP (ref 1–1.03)
SQUAMOUS # UR AUTO: 8 /HPF — HIGH (ref 0–5)
UROBILINOGEN FLD QL: 1 MG/DL — SIGNIFICANT CHANGE UP (ref 0.2–1)
WBC # BLD: 14.29 K/UL — HIGH (ref 3.8–10.5)
WBC # FLD AUTO: 14.29 K/UL — HIGH (ref 3.8–10.5)
WBC UR QL: 1 /HPF — SIGNIFICANT CHANGE UP (ref 0–5)

## 2024-03-12 PROCEDURE — 99285 EMERGENCY DEPT VISIT HI MDM: CPT

## 2024-03-12 PROCEDURE — 74176 CT ABD & PELVIS W/O CONTRAST: CPT | Mod: 26,MC

## 2024-03-12 NOTE — ED ADULT NURSE NOTE - CAS EDN DISCHARGE ASSESSMENT
[facility] - Oncology ([facility])  Hematology/Oncology  Progress Note    Patient Name: Janusz Montgomery Jr.  Admission Date: 8/23/2022  Hospital Length of Stay: 6 days  Code Status: Full Code     Subjective:     HPI:  Janusz Montgomery is a 76 y.o. 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 Follows with Dr. Hargrove. Completed chemoradiation therapy on 8/1/22. Reports some redness to right neck which he attributes to radiation. However, has since developed redness, swelling, and tenderness overlying right chest port concerning for possible infection over last 2-3 days. Denies fever or chills. Increasing edema in extremities worse in RUE over last 4-5 days and generalized weakness causing difficulty ambulating. Recently hospitalized with dehydration, NAT, weakness, C dif. Completed vancomycin PO 7 days and diarrhea resolved. Rehydrated and concern that he then became overloaded. Cr uptrending on discharge. Recently saw nephro 8/22 for hospital f/u, noted worsening Cr baseline and increased Lasix to 40 mg daily.      Interval History: no events overnight. Remains on 4 L of oxygen. Tolerated PO abx without issues.     Pending SNF placement. Likely discharge tomorrow     Oncology Treatment Plan:   OP CARBOPLATIN WEEKLY    Medications:  Continuous Infusions:  Scheduled Meds:   acyclovir  200 mg Oral BID    albuterol-ipratropium  3 mL Nebulization Q6H    amiodarone  200 mg Oral Daily    atorvastatin  40 mg Oral QHS    cephALEXin  500 mg Oral Q6H    doxycycline  100 mg Oral Q12H    enoxaparin  120 mg Subcutaneous Q12H    furosemide  40 mg Oral Daily    gabapentin  200 mg Oral Daily    melatonin  9 mg Oral QHS    metoprolol succinate  25 mg Oral Daily    oxybutynin  5 mg Oral QHS    pantoprazole   40 mg Oral Daily    polyethylene glycol  17 g Oral BID    tamsulosin  1 capsule Oral QHS    vancomycin  125 mg Oral Q12H    venlafaxine  75 mg Oral QHS     PRN Meds:sodium chloride, acetaminophen, aluminum-magnesium hydroxide-simethicone, artificial tears, dextrose 10%, dextrose 10%, glucagon (human recombinant), glucose, glucose, HYDROcodone-acetaminophen, naloxone, ondansetron, promethazine, senna-docusate 8.6-50 mg, simethicone, sodium chloride 0.9%     Review of Systems   Constitutional:  Negative for chills, diaphoresis, fatigue and fever.   HENT:  Negative for congestion, rhinorrhea, sore throat and trouble swallowing.    Respiratory:  Negative for cough, chest tightness, shortness of breath and wheezing.    Cardiovascular:  Negative for chest pain, palpitations and leg swelling.   Gastrointestinal:  Negative for abdominal distention, abdominal pain, blood in stool, diarrhea, nausea and vomiting.   Genitourinary:  Negative for difficulty urinating, dysuria, flank pain and hematuria.   Musculoskeletal:  Negative for arthralgias, back pain and neck pain.   Skin:  Negative for rash and wound.   Neurological:  Negative for dizziness, syncope, weakness, light-headedness, numbness and headaches.   Objective:     Vital Signs (Most Recent):  Temp: 98.4 °F (36.9 °C) (08/30/22 1558)  Pulse: 84 (08/30/22 1558)  Resp: 15 (08/30/22 1558)  BP: (!) 99/44 (08/30/22 1558)  SpO2: 99 % (08/30/22 1558) Vital Signs (24h Range):  Temp:  [98.2 °F (36.8 °C)-98.4 °F (36.9 °C)] 98.4 °F (36.9 °C)  Pulse:  [80-88] 84  Resp:  [15-22] 15  SpO2:  [94 %-99 %] 99 %  BP: ()/(44-84) 99/44     Weight: (!) 142.7 kg (314 lb 9.5 oz)  Body mass index is 41.51 kg/m².  Body surface area is 2.71 meters squared.      Intake/Output Summary (Last 24 hours) at 8/30/2022 3863  Last data filed at 8/30/2022 0800  Gross per 24 hour   Intake --   Output 812 ml   Net -812 ml         Physical Exam  Constitutional:       General: He is not in acute  distress.     Appearance: He is not ill-appearing.   HENT:      Head: Normocephalic and atraumatic.      Mouth/Throat:      Mouth: Mucous membranes are moist.      Pharynx: Oropharynx is clear.   Eyes:      Extraocular Movements: Extraocular movements intact.      Pupils: Pupils are equal, round, and reactive to light.   Neck:      Comments: Right neck erythema  Cardiovascular:      Rate and Rhythm: Normal rate and regular rhythm.      Pulses: Normal pulses.      Heart sounds: Normal heart sounds.   Pulmonary:      Effort: Pulmonary effort is normal. No respiratory distress.      Breath sounds: No wheezing.   Abdominal:      General: Abdomen is flat. Bowel sounds are normal.      Palpations: Abdomen is soft.   Musculoskeletal:      Cervical back: Neck supple.      Right lower leg: Edema present.      Left lower leg: Edema present.   Skin:     General: Skin is warm and dry.      Capillary Refill: Capillary refill takes less than 2 seconds.      Comments: Incision from  port removal that is CDI with  improvement in erythema. No active bleeding.    Neurological:      General: No focal deficit present.      Mental Status: He is alert and oriented to person, place, and time. Mental status is at baseline.   Psychiatric:         Mood and Affect: Mood normal.         Behavior: Behavior normal.         Thought Content: Thought content normal.         Judgment: Judgment normal.       Significant Labs:   Recent Lab Results         08/30/22  1025   08/30/22  0316        Albumin   2.8       Alkaline Phosphatase   131       ALT   24       Anion Gap   9       AST   23       Baso #   0.03       Basophil %   0.4       BILIRUBIN TOTAL   0.8  Comment: For infants and newborns, interpretation of results should be based  on gestational age, weight and in agreement with clinical  observations.    Premature Infant recommended reference ranges:  Up to 24 hours.............<8.0 mg/dL  Up to 48 hours............<12.0 mg/dL  3-5  days..................<15.0 mg/dL  6-29 days.................<15.0 mg/dL         BUN   18       Calcium   9.1       Chloride   94       CO2   38       Creatinine   1.3       Differential Method   Automated       eGFR   57.3       Eos #   0.1       Eosinophil %   1.5       Glucose   101       Gran # (ANC)   6.1       Gran %   78.0       Hematocrit   22.8       Hemoglobin   7.9       Immature Grans (Abs)   0.02  Comment: Mild elevation in immature granulocytes is non specific and   can be seen in a variety of conditions including stress response,   acute inflammation, trauma and pregnancy. Correlation with other   laboratory and clinical findings is essential.         Immature Granulocytes   0.3       Lymph #   0.7       Lymph %   9.1       Magnesium   1.7       MCH   34.2       MCHC   34.6       MCV   99       Mono #   0.8       Mono %   10.7       MPV   10.0       nRBC   0       Platelets   144       Potassium   3.8       PROTEIN TOTAL   6.1       RBC   2.31       RDW   17.5       SARS-CoV-2 RNA, Amplification, Qual Negative  Comment: This test utilizes isothermal nucleic acid amplification   technology to detect the SARS-CoV-2 RdRp nucleic acid segment.   The analytical sensitivity (limit of detection) is 125 genome   equivalents/mL.     A POSITIVE result implies infection with the SARS-CoV-2 virus;  the patient is presumed to be contagious.    A NEGATIVE result means that SARS-CoV-2 nucleic acids are not  present above the limit of detection. A NEGATIVE result should be   treated as presumptive. It does not rule out the possibility of   COVID-19 and should not be the sole basis for treatment decisions.   If COVID-19 is strongly suspected based on clinical and exposure   history, re-testing using an alternate molecular assay should be   considered.       This test is only for use under the Food and Drug   Administration s Emergency Use Authorization (EUA).   Commercial kits are provided by [employer].    Performance characteristics of the EUA have been independently  verified by [facility] Department of  Pathology and Laboratory Medicine.   _________________________________________________________________  The ID NOW COVID-19 Letter of Authorization, along with the   authorized Fact Sheet for Healthcare Providers, the authorized Fact  Sheet for Patients, and authorized labeling are available on the FDA   website:  www.fda.gov/MedicalDevices/Safety/EmergencySituations/dqw012873.htm           Sodium   141       WBC   7.82               Diagnostic Results:  I have reviewed all pertinent imaging results/findings within the past 24 hours.    Assessment/Plan:     * Infection of venous access port  -- ID on board, cultures have been negative so far. abx transitioned to PO with keflex and doxycycline for total of 14 days  -- Port removed by IR 8/26     Acute DVT (deep venous thrombosis)  - Lovenox resumed 8/28.   - monitor anti Xa levels     Chronic respiratory failure with hypoxia  Patient with Hypoxic Respiratory failure which is Chronic.  he is on home oxygen at 4 LPM. Supplemental oxygen was provided and noted-  .   Signs/symptoms of respiratory failure include- lethargy. Contributing diagnoses includes - CHF, restrictive lung disease. Labs and images were reviewed. Patient Has not had a recent ABG. Will treat underlying causes and adjust management of respiratory failure as follows- Continue home 4L O2    - Duonebs PRN for mild wheezing on 8/28   - repeat CXR negative for acute findings     Head and neck cancer  -- Squamous cell carcinoma of the right head and neck s/p radical neck dissection and chemoradiation with carboplatin, completed 8/1/22. Follows with Dr. Hargrove.     bx of the right sided chest area results as squamous cell carcinoma. Ct chest showed pulmonary nodules and CT neck showed soft tissue mass at site of previous malignancy. Overall picture consistent with disease progression.    Plan for  discharge to SNF to improve performance status and follow up with oncology as outpatient      Paroxysmal atrial fibrillation   -- Continue home amiodarone 200 mg daily, metoprolol succinate 25 mg daily  -- F/u with his cardiologist (Dr. Sandhu) as scheduled             Art Carvalho MD  Hematology/Oncology  [facility] - Oncology ([facility])     Alert and oriented to person, place and time

## 2024-03-12 NOTE — ED ADULT TRIAGE NOTE - GLASGOW COMA SCALE: BEST VERBAL RESPONSE, MLM
Faxed ov notes and CR to [facility] at [phone] via [employer].  
(V5) oriented

## 2024-03-12 NOTE — ED ADULT NURSE NOTE - OBJECTIVE STATEMENT
pt received into spot L A&Ox4 ambulatory appears comfortable arrives via walk in triage for eval of pain burning with urination typical of her UTI's in the past states shes been tryng to take AZO an otc medication for symptoms but unrelieved. Pt denies fever chills flank pain. Reports hx of kidney stones in the past. Respirations unlabored airway patent. abd soft nondistended no n/v/d constipation. IV placed labs sent

## 2024-03-12 NOTE — ED ADULT TRIAGE NOTE - CHIEF COMPLAINT QUOTE
Pt presents referred by [facility]. Pt reports "UTI symptoms" x one week, to include burning with urination, "bladder feels full all the time", and urinary frequency. Pt states "I know I have a UTI so I went to [facility] for antibiotics". Pt t was dx with "kidney stone" at OSH x 4 months ago, told to come to ER for CT. Pt denies any flank pain or s/s consistent with previous kidney stone. Did not get requested antibiotics from [facility]. Pt denies any fevers, has minimal pain 1/10 at present.

## 2024-03-12 NOTE — ED ADULT NURSE NOTE - NSFALLUNIVINTERV_ED_ALL_ED
Bed/Stretcher in lowest position, wheels locked, appropriate side rails in place/Call bell, personal items and telephone in reach/Instruct patient to call for assistance before getting out of bed/chair/stretcher/Non-slip footwear applied when patient is off stretcher/[place] to call system/Physically safe environment - no spills, clutter or unnecessary equipment/Purposeful proactive rounding/Room/bathroom lighting operational, light cord in reach

## 2024-03-13 VITALS
OXYGEN SATURATION: 99 % | RESPIRATION RATE: 16 BRPM | HEART RATE: 96 BPM | SYSTOLIC BLOOD PRESSURE: 136 MMHG | TEMPERATURE: 99 F | DIASTOLIC BLOOD PRESSURE: 82 MMHG

## 2024-03-13 PROCEDURE — 81001 URINALYSIS AUTO W/SCOPE: CPT

## 2024-03-13 PROCEDURE — 36415 COLL VENOUS BLD VENIPUNCTURE: CPT

## 2024-03-13 PROCEDURE — 74176 CT ABD & PELVIS W/O CONTRAST: CPT | Mod: MC

## 2024-03-13 PROCEDURE — 87086 URINE CULTURE/COLONY COUNT: CPT

## 2024-03-13 PROCEDURE — 99284 EMERGENCY DEPT VISIT MOD MDM: CPT | Mod: 25

## 2024-03-13 PROCEDURE — 80048 BASIC METABOLIC PNL TOTAL CA: CPT

## 2024-03-13 PROCEDURE — 85025 COMPLETE CBC W/AUTO DIFF WBC: CPT

## 2024-03-13 RX ORDER — CIPROFLOXACIN LACTATE 400MG/40ML
1 VIAL (ML) INTRAVENOUS
Qty: 6 | Refills: 0
Start: 2024-03-13 | End: 2024-03-15

## 2024-03-13 RX ORDER — TAMSULOSIN HYDROCHLORIDE 0.4 MG/1
1 CAPSULE ORAL
Qty: 14 | Refills: 0
Start: 2024-03-13 | End: 2024-03-26

## 2024-03-13 RX ORDER — CIPROFLOXACIN LACTATE 400MG/40ML
500 VIAL (ML) INTRAVENOUS ONCE
Refills: 0 | Status: COMPLETED | OUTPATIENT
Start: 2024-03-13 | End: 2024-03-13

## 2024-03-13 RX ADMIN — Medication 500 MILLIGRAM(S): at 03:47

## 2024-03-13 NOTE — CONSULT NOTE ADULT - ASSESSMENT
53F hx depression presents with dysuria, suprapubic pressure. Had RLQ pain abt 1 month ago- was seen at OSH found to have L prox ureteral 4mm stone; sent home with flomax. Pt states she has been pain free for abt 2 weeks but now c/o dysuria and suprapubic pressure. Seen at urgent care-sent to ED. Denies abd/flank pain, fevers, chills, nausea, vomiting, hematuria.   Pt is afebrile, hemodynamically stable. Labs show wbc 14.29 Cr 0.74 UA pos nit/small leukocyte esterase. Ct shows  4 mm stone in the posterior left urinary bladder/left ureterovesicular junction, possibly representing a recently passed or passing stone. No hydronephrosis    Plan:   53F hx depression presents with dysuria, suprapubic pressure. Had RLQ pain abt 1 month ago- was seen at OSH found to have L prox ureteral 4mm stone; sent home with flomax. Pt states she has been pain free for abt 2 weeks but now c/o dysuria and suprapubic pressure. Seen at urgent care-sent to ED. Denies abd/flank pain, fevers, chills, nausea, vomiting, hematuria.  Denies hx kidney stones.   Pt is afebrile, hemodynamically stable. Labs show wbc 14.29 Cr 0.74 UA pos nit/small leukocyte esterase. Ct shows  4 mm stone in the posterior left urinary bladder/left ureterovesicular junction, possibly representing a recently passed or passing stone. No hydronephrosis    Plan:   53F hx depression presents with dysuria, suprapubic pressure. Had RLQ pain abt 1 month ago- was seen at OSH found to have L prox ureteral 4mm stone; sent home with flomax. Pt states she has been pain free for abt 2 weeks but now c/o dysuria and suprapubic pressure. Seen at urgent care-sent to ED. Denies abd/flank pain, fevers, chills, nausea, vomiting, hematuria.  Denies hx kidney stones. Of note, pt has been taking OTC azo pyridium for dysuria. She voided without issues. PVR 0.   Pt is afebrile, hemodynamically stable. Labs show wbc 14.29 Cr 0.74 UA pos nit/small leukocyte esterase. Ct shows  4 mm stone in the posterior left urinary bladder/left ureterovesicular junction, possibly representing a recently passed or passing stone. No hydronephrosis    Plan:  -ok to discharge pt home  -f/u Ucx  -please send with cipro 500mg BID x 3 days, pyridium, flomax  -strainer  -f/u  clinic  Urology Clinic  [address], [address] Tel [phone]

## 2024-03-13 NOTE — ED PROVIDER NOTE - PATIENT PORTAL LINK FT
You can access the FollowMyHealth Patient Portal offered by [facility] by registering at the following website: [email or web address]. By joining [facility]’s FollowMyHealth portal, you will also be able to view your health information using other applications (apps) compatible with our system.

## 2024-03-13 NOTE — ED PROVIDER NOTE - PHYSICAL EXAMINATION
Constitutional : Well appearing, non-toxic, no acute distress. awake, alert, oriented to person, place, time/situation.  Head : head normocephalic, atraumatic  EENMT : eyes clear bilaterally, PERRL, EOMI. airway patent. moist mucous membranes. neck supple.  Cardiac : Normal rate, regular rhythm. No murmur appreciated, no LE edema.  Resp : Breath sounds clear and equal bilaterally. Respirations even and unlabored.   Gastro : abdomen soft, nontender, nondistended. no rebound or guarding. no CVAT.  MSK :  range of motion is not limited, no muscle or joint tenderness  Back : No evidence of trauma. No spinal or CVA tenderness.  Vasc : Extremities warm and well perfused. 2+ radial and DP pulses. cap refill <2 seconds  Neuro : Alert and oriented, CNII-XII grossly intact, no motor or sensory deficits.  Skin : Skin normal color for race, warm, dry and intact. No evidence of rash.  Psych : Alert and oriented to person, place, time/situation. normal mood and affect. no apparent risk to self or others.

## 2024-03-13 NOTE — ED PROVIDER NOTE - PROGRESS NOTE DETAILS
wbc count 14.29. labs otherwise ok w creatinine wnl.   UA small leuks, nitrite positive, mod bacterial. 1wbc.   CT "IMPRESSION:  1.  There is a 4 mm stone in the posterior left urinary bladder/left   ureterovesicular junction, possibly representing a recently passed or   passing stone.  2.  No hydronephrosis." workup as above. on rpt vitals temp 99.   leukocytosis, borderline temp uro consulted for possible infected stone.  seen by uro - recs dc on cipro bid x 3 days. f/u culture. f/u this week for uro eval in clniic.  pain controlled, rpt abd exam benign, tolerating po.   given first dose abd. flomax rx sent. ok for dc    All results reviewed with the patient verbally. Discharge plan and return precautions d/w pt who verbalized understanding and agrees with plan. All questions answered. Vitals WNL. Ready for d/c.

## 2024-03-13 NOTE — ED PROVIDER NOTE - IV ALTEPLASE EXCL ABS HIDDEN
PT/PTA met face to face to discuss pt's treatment plan and progress towards established goals. Pt will be seen by a physical therapist minimally every 6th visit or every 30 days.      Leonie Ackerman PTA    
show

## 2024-03-13 NOTE — ED PROVIDER NOTE - NSFOLLOWUPINSTRUCTIONS_ED_ALL_ED_FT
You have a kidney stone. Your urine also looks like it may be infected.     GENERAL INSTRUCTIONS - Be sure to strain your urine and try to find the stone to bring to the urologist for analysis. You will need to stay well hydrated and take various medications to make this process easier. Drink lots of fluids.    MEDICATIONS -  Take flomax to help with urination and to help the kidney stone pass.   Take tylenol / motrin for pain.   Take CIPROFLOXACIN (antibiotic) for urine.     FOLLOW UP - Follow up with UROLOGY this FRIDAY for continued evaluation. Call office listed today in the morning to make an appointment.     Urology Clinic   [address]  [phone]    RETURN PRECAUTIONS - Return to this Emergency Department if you experience more that 6 hours of pain that cannot be controlled by medication as it has been directed, bloody urine with clots that are preventing you from being able to urinate, or inability to urinate for unknown reason, severe abdominal/flank/back pain, uncontrollable excessive vomiting, any fevers, or any other concerns.

## 2024-03-13 NOTE — ED PROVIDER NOTE - CLINICAL SUMMARY MEDICAL DECISION MAKING FREE TEXT BOX
history of depression, dx w L sided kidney stone at OSH one month ago. now here w few days of suprapubic discomfort, dysuria. feels like previous utis.  pt well appearing, stable vitals, exam reassuring - no abd tenderness, no CVA tendernes.   UA from urgent care positive for UTI.   sent ua/ucx here.   currently w no pain, intermittent pain in last few days. will get CT to assess for stone. r/o infected stone.   asses for leukocytosis, kidney function  plan - labs, ua/ucx, ct  analgesia offered and declined  serial abd exams.

## 2024-03-13 NOTE — CONSULT NOTE ADULT - SUBJECTIVE AND OBJECTIVE BOX
53F hx depression presents with dysuria, suprapubic pressure. Had RLQ pain abt 1 month ago- was seen at OSH found to have L prox ureteral 4mm stone; sent home with flomax. Pt states she has been pain free for abt 2 weeks but now c/o dysuria and suprapubic pressure. Seen at urgent care-sent to ED. Denies abd/flank pain, fevers, chills, nausea, vomiting, hematuria.     Vital Signs Last 24 Hrs  T(C): 37.2 (13 Mar 2024 01:27), Max: 37.2 (13 Mar 2024 01:27)  T(F): 99 (13 Mar 2024 01:27), Max: 99 (13 Mar 2024 01:27)  HR: 96 (13 Mar 2024 01:27) (92 - 96)  BP: 136/82 (13 Mar 2024 01:27) (126/82 - 136/82)  BP(mean): --  RR: 16 (13 Mar 2024 01:27) (16 - 17)  SpO2: 99% (13 Mar 2024 01:27) (98% - 99%)    Parameters below as of 12 Mar 2024 20:55  Patient On (Oxygen Delivery Method): room air      I&O's Summary      PE:  Gen: awake and alert  Abd: nontender, nondistended  : no suprapubic/CVAT      LABS:                        13.0   14.29 )-----------( 395      ( 12 Mar 2024 22:16 )             40.7     03-12    137  |  102  |  9   ----------------------------<  102<H>  4.4   |  24  |  0.74    Ca    9.9      12 Mar 2024 22:16      < from: CT Abdomen and Pelvis No Cont (03.12.24 @ 23:20) >  PROCEDURE:  CT of the Abdomen and Pelvis was performed.  Sagittal and coronal reformats were performed.    FINDINGS:  LOWER CHEST: Within normal limits.    LIVER: Within normal limits.  BILE DUCTS: Normal caliber.  GALLBLADDER: Within normal limits.  SPLEEN: Within normal limits.  PANCREAS: Within normal limits.  ADRENALS: Within normal limits.  KIDNEYS/URETERS: Within normal limits.    BLADDER: There is a 4 mm stone in the posterior left urinary bladder/left   ureterovesicular junction, possibly representing a recently passed or   passing stone.  REPRODUCTIVE ORGANS: Uterus and adnexa within normal limits.    BOWEL: No bowel obstruction. Appendix is normal.  PERITONEUM: No ascites.  VESSELS: Within normal limits.  RETROPERITONEUM/LYMPH NODES: No lymphadenopathy.  ABDOMINAL WALL: Small fat-containing umbilical hernia.  BONES: Degenerative changes.    IMPRESSION:  1.  There is a 4 mm stone in the posterior left urinary bladder/left   ureterovesicular junction, possibly representing a recently passed or   passing stone.  2.  No hydronephrosis.    < end of copied text >    Cultures      A/P 53F hx depression presents with dysuria, suprapubic pressure. Had RLQ pain abt 1 month ago- was seen at OSH found to have L prox ureteral 4mm stone; sent home with flomax. Pt states she has been pain free for abt 2 weeks but now c/o dysuria and suprapubic pressure. Seen at urgent care-sent to ED. Denies abd/flank pain, fevers, chills, nausea, vomiting, hematuria. Denies hx kidney stones.     Vital Signs Last 24 Hrs  T(C): 37.2 (13 Mar 2024 01:27), Max: 37.2 (13 Mar 2024 01:27)  T(F): 99 (13 Mar 2024 01:27), Max: 99 (13 Mar 2024 01:27)  HR: 96 (13 Mar 2024 01:27) (92 - 96)  BP: 136/82 (13 Mar 2024 01:27) (126/82 - 136/82)  BP(mean): --  RR: 16 (13 Mar 2024 01:27) (16 - 17)  SpO2: 99% (13 Mar 2024 01:27) (98% - 99%)    Parameters below as of 12 Mar 2024 20:55  Patient On (Oxygen Delivery Method): room air      I&O's Summary      PE:  Gen: awake and alert  Abd: nontender, nondistended  : no suprapubic/CVAT      LABS:                        13.0   14.29 )-----------( 395      ( 12 Mar 2024 22:16 )             40.7     03-12    137  |  102  |  9   ----------------------------<  102<H>  4.4   |  24  |  0.74    Ca    9.9      12 Mar 2024 22:16      < from: CT Abdomen and Pelvis No Cont (03.12.24 @ 23:20) >  PROCEDURE:  CT of the Abdomen and Pelvis was performed.  Sagittal and coronal reformats were performed.    FINDINGS:  LOWER CHEST: Within normal limits.    LIVER: Within normal limits.  BILE DUCTS: Normal caliber.  GALLBLADDER: Within normal limits.  SPLEEN: Within normal limits.  PANCREAS: Within normal limits.  ADRENALS: Within normal limits.  KIDNEYS/URETERS: Within normal limits.    BLADDER: There is a 4 mm stone in the posterior left urinary bladder/left   ureterovesicular junction, possibly representing a recently passed or   passing stone.  REPRODUCTIVE ORGANS: Uterus and adnexa within normal limits.    BOWEL: No bowel obstruction. Appendix is normal.  PERITONEUM: No ascites.  VESSELS: Within normal limits.  RETROPERITONEUM/LYMPH NODES: No lymphadenopathy.  ABDOMINAL WALL: Small fat-containing umbilical hernia.  BONES: Degenerative changes.    IMPRESSION:  1.  There is a 4 mm stone in the posterior left urinary bladder/left   ureterovesicular junction, possibly representing a recently passed or   passing stone.  2.  No hydronephrosis.    < end of copied text >    Cultures      A/P 53F hx depression presents with dysuria, suprapubic pressure. Had RLQ pain abt 1 month ago- was seen at OSH found to have L prox ureteral 4mm stone; sent home with flomax. Pt states she has been pain free for abt 2 weeks but now c/o dysuria and suprapubic pressure. Seen at urgent care-sent to ED. Denies abd/flank pain, fevers, chills, nausea, vomiting, hematuria. Denies hx kidney stones. Of note, pt has been taking OTC azo pyridium for dysuria. She voided without issues. PVR 0.     Vital Signs Last 24 Hrs  T(C): 37.2 (13 Mar 2024 01:27), Max: 37.2 (13 Mar 2024 01:27)  T(F): 99 (13 Mar 2024 01:27), Max: 99 (13 Mar 2024 01:27)  HR: 96 (13 Mar 2024 01:27) (92 - 96)  BP: 136/82 (13 Mar 2024 01:27) (126/82 - 136/82)  BP(mean): --  RR: 16 (13 Mar 2024 01:27) (16 - 17)  SpO2: 99% (13 Mar 2024 01:27) (98% - 99%)    Parameters below as of 12 Mar 2024 20:55  Patient On (Oxygen Delivery Method): room air      I&O's Summary      PE:  Gen: awake and alert  Abd: nontender, nondistended  : no suprapubic/CVAT      LABS:                        13.0   14.29 )-----------( 395      ( 12 Mar 2024 22:16 )             40.7     03-12    137  |  102  |  9   ----------------------------<  102<H>  4.4   |  24  |  0.74    Ca    9.9      12 Mar 2024 22:16      < from: CT Abdomen and Pelvis No Cont (03.12.24 @ 23:20) >  PROCEDURE:  CT of the Abdomen and Pelvis was performed.  Sagittal and coronal reformats were performed.    FINDINGS:  LOWER CHEST: Within normal limits.    LIVER: Within normal limits.  BILE DUCTS: Normal caliber.  GALLBLADDER: Within normal limits.  SPLEEN: Within normal limits.  PANCREAS: Within normal limits.  ADRENALS: Within normal limits.  KIDNEYS/URETERS: Within normal limits.    BLADDER: There is a 4 mm stone in the posterior left urinary bladder/left   ureterovesicular junction, possibly representing a recently passed or   passing stone.  REPRODUCTIVE ORGANS: Uterus and adnexa within normal limits.    BOWEL: No bowel obstruction. Appendix is normal.  PERITONEUM: No ascites.  VESSELS: Within normal limits.  RETROPERITONEUM/LYMPH NODES: No lymphadenopathy.  ABDOMINAL WALL: Small fat-containing umbilical hernia.  BONES: Degenerative changes.    IMPRESSION:  1.  There is a 4 mm stone in the posterior left urinary bladder/left   ureterovesicular junction, possibly representing a recently passed or   passing stone.  2.  No hydronephrosis.    < end of copied text >    Cultures      A/P

## 2024-03-13 NOTE — ED PROVIDER NOTE - OBJECTIVE STATEMENT
53 yr old female, history of depression, presents to the Emergency Department w urinary symptoms. pt w L flank pain one month ago - seen at OSH ER and was diagnosed w L sided kidney stone (4mm proximal ureteral stone). has been pain free since ed visit. now here w few days of suprapubic discomfort, dysuria. taking azo w/o relief. feels like previous utis. went to [facility] and was sent for further eval.   no fever, chills, n/v/d, hematuria, urinary frequency ro urgency.

## 2024-03-13 NOTE — ED ADULT NURSE REASSESSMENT NOTE - NS ED NURSE REASSESS COMMENT FT1
pt resting on bed no distress noted pending CT report. Report given to nightshift RN for continued care and final dispo

## 2024-03-14 LAB
CULTURE RESULTS: SIGNIFICANT CHANGE UP
SPECIMEN SOURCE: SIGNIFICANT CHANGE UP

## 2024-03-15 DIAGNOSIS — D72.829 ELEVATED WHITE BLOOD CELL COUNT, UNSPECIFIED: ICD-10-CM

## 2024-03-15 DIAGNOSIS — N39.0 URINARY TRACT INFECTION, SITE NOT SPECIFIED: ICD-10-CM

## 2024-03-15 DIAGNOSIS — F32.A DEPRESSION, UNSPECIFIED: ICD-10-CM

## 2024-03-15 DIAGNOSIS — N20.0 CALCULUS OF KIDNEY: ICD-10-CM

## 2024-03-15 DIAGNOSIS — K42.9 UMBILICAL HERNIA WITHOUT OBSTRUCTION OR GANGRENE: ICD-10-CM

## 2024-03-15 DIAGNOSIS — R10.9 UNSPECIFIED ABDOMINAL PAIN: ICD-10-CM

## 2024-03-20 ENCOUNTER — NON-APPOINTMENT (OUTPATIENT)
Age: 54
End: 2024-03-20

## 2024-03-22 ENCOUNTER — APPOINTMENT (OUTPATIENT)
Dept: UROLOGY | Facility: CLINIC | Age: 54
End: 2024-03-22
Payer: COMMERCIAL

## 2024-03-22 DIAGNOSIS — N20.0 CALCULUS OF KIDNEY: ICD-10-CM

## 2024-03-22 PROCEDURE — 99203 OFFICE O/P NEW LOW 30 MIN: CPT

## 2024-03-22 RX ORDER — TAMSULOSIN HYDROCHLORIDE 0.4 MG/1
0.4 CAPSULE ORAL
Qty: 30 | Refills: 0 | Status: ACTIVE | COMMUNITY
Start: 2024-03-22 | End: 1900-01-01

## 2024-03-22 NOTE — HISTORY OF PRESENT ILLNESS
[FreeTextEntry1] : CC: Kidney stone and UTI F/U  54 yo female with history of infrequent UTIs, 4mm left renal stone 2/2024, UTI 3/2024, fatty liver presenting for renal stone follow up after discharge from [facility] ED 3/12/24.   Patient initially felt 9/10 left groin pain on 2/14 that went away after 6 hours without pain medications. Pain returned 10 days later on 2/24/24 with 10/10 left groin. Patient went to [facility] ED in [facility] with CT significant for 4mm calculus in left UPJ, received toradol and flomax on discharge. Patient states she felt no pain since discharge and drank fluids. States her urine had a slight brown tint during this time.  Patient states she was well until 3/10 when she felt burning with urination, sensation of retention, and general left side discomfort. Patient states she treated herself for potential UTI before going to [facility] ED on 3/12. CT and ultrasound confirmed 4mm calculus now in left UVJ. Patient was afebrile. Labs significant for elevated WBC. UA significant for positive nitrite and moderate bacteria. Urine culture was contaminated. Patient was discharged with ciprofloxacin 500mg BID x3 days, flomax 0.4 mg QD, toradol 10mg PRN.   Patient feels well today. She is nervous that she could get a new stone in the future.  Denies urgency, frequency, incontinence.  States urine yellow to clear. Patient is drinking 48oz of water per day since 2/24.  Denies fever, chills, nausea, vomiting, changes to appetite  Family history: history of hyperparathyroidism, large renal stone, and end stage renal disease, stage 1 colon cancer in mother. No renal cancer history. Social: Does not alcohol. Never smoker. No recreational drugs. Sexual: Not sexually active.

## 2024-03-22 NOTE — ASSESSMENT
[FreeTextEntry1] : 54 y/o female with history of prior infrequent UTIs presents for 4mm left UVJ stone (was in L UPJ 2/2024, L UVJ on repeat CT 3/2024). She was found to have a UTI at her presentation to [facility] ED last week, was discharged with ciprofloxacin x3d (urine culture was contaminated). Her flank pain and urinary symptoms resolved after her 2nd day of ciprofloxacin; no infectious symptoms.   We discussed that if the stone does not pass within a 4-6 week period on medical expulsive therapy, she would require a ureteroscopy with laser lithotripsy for definitive stone management. We also discussed ED return precautions including fevers/chills, severe abdominal/flank pain, and/or intractable nausea/vomiting.    Plan: -Repeat non-con CT A/P (prone, to differentiate between UVJ stone and free-floating stone in bladder) -Continue MET with Flomax (refill sent to pharmacy) -ED return precautions provided -RTC in 2 weeks to review CT and discuss next steps

## 2024-03-22 NOTE — PHYSICAL EXAM
[General Appearance - In No Acute Distress] : no acute distress [Abdomen Soft] : soft [] : no respiratory distress [Abdomen Tenderness] : non-tender [Oriented To Time, Place, And Person] : oriented to person, place, and time [Costovertebral Angle Tenderness] : no ~M costovertebral angle tenderness

## 2024-05-02 NOTE — ED ADULT TRIAGE NOTE - NS ED TRIAGE AVPU SCALE
No PCP
Alert-The patient is alert, awake and responds to voice. The patient is oriented to time, place, and person. The triage nurse is able to obtain subjective information.

## 2025-04-04 NOTE — ED PROVIDER NOTE - MDM ORDERS SUBMITTED SELECTION
Patient attended Phase 2 Cardiac Rehab Exercise Session. Further documentation will be scanned into the medical record upon discharge.   Labs/EKG/Imaging Studies Labs/EKG/Imaging Studies/Medications